# Patient Record
Sex: MALE | Race: WHITE | Employment: OTHER | URBAN - METROPOLITAN AREA
[De-identification: names, ages, dates, MRNs, and addresses within clinical notes are randomized per-mention and may not be internally consistent; named-entity substitution may affect disease eponyms.]

---

## 2019-09-30 ENCOUNTER — HOSPITAL ENCOUNTER (EMERGENCY)
Age: 24
Discharge: HOME | End: 2019-09-30
Attending: EMERGENCY MEDICINE
Payer: OTHER GOVERNMENT

## 2019-09-30 ENCOUNTER — APPOINTMENT (OUTPATIENT)
Dept: GENERAL RADIOLOGY | Age: 24
End: 2019-09-30
Attending: PHYSICIAN ASSISTANT
Payer: OTHER GOVERNMENT

## 2019-09-30 VITALS
TEMPERATURE: 98.5 F | WEIGHT: 168 LBS | SYSTOLIC BLOOD PRESSURE: 119 MMHG | OXYGEN SATURATION: 100 % | BODY MASS INDEX: 24.05 KG/M2 | HEIGHT: 70 IN | DIASTOLIC BLOOD PRESSURE: 69 MMHG | RESPIRATION RATE: 14 BRPM | HEART RATE: 80 BPM

## 2019-09-30 DIAGNOSIS — R07.89 ATYPICAL CHEST PAIN: Primary | ICD-10-CM

## 2019-09-30 LAB
ALBUMIN SERPL-MCNC: 3.9 G/DL (ref 3.4–5)
ALBUMIN/GLOB SERPL: 1.1 {RATIO} (ref 0.8–1.7)
ALP SERPL-CCNC: 57 U/L (ref 45–117)
ALT SERPL-CCNC: 30 U/L (ref 16–61)
ANION GAP SERPL CALC-SCNC: 2 MMOL/L (ref 3–18)
AST SERPL-CCNC: 19 U/L (ref 10–38)
BASOPHILS # BLD: 0 K/UL (ref 0–0.1)
BASOPHILS NFR BLD: 1 % (ref 0–2)
BILIRUB SERPL-MCNC: 0.5 MG/DL (ref 0.2–1)
BUN SERPL-MCNC: 12 MG/DL (ref 7–18)
BUN/CREAT SERPL: 13 (ref 12–20)
CALCIUM SERPL-MCNC: 9 MG/DL (ref 8.5–10.1)
CHLORIDE SERPL-SCNC: 105 MMOL/L (ref 100–111)
CK MB CFR SERPL CALC: 2.2 % (ref 0–4)
CK MB SERPL-MCNC: 3.3 NG/ML (ref 5–25)
CK SERPL-CCNC: 150 U/L (ref 39–308)
CO2 SERPL-SCNC: 32 MMOL/L (ref 21–32)
CREAT SERPL-MCNC: 0.92 MG/DL (ref 0.6–1.3)
DIFFERENTIAL METHOD BLD: ABNORMAL
EOSINOPHIL # BLD: 0.2 K/UL (ref 0–0.4)
EOSINOPHIL NFR BLD: 4 % (ref 0–5)
ERYTHROCYTE [DISTWIDTH] IN BLOOD BY AUTOMATED COUNT: 13.5 % (ref 11.6–14.5)
GLOBULIN SER CALC-MCNC: 3.6 G/DL (ref 2–4)
GLUCOSE SERPL-MCNC: 82 MG/DL (ref 74–99)
HCT VFR BLD AUTO: 43.6 % (ref 36–48)
HGB BLD-MCNC: 14.3 G/DL (ref 13–16)
LYMPHOCYTES # BLD: 1 K/UL (ref 0.9–3.6)
LYMPHOCYTES NFR BLD: 19 % (ref 21–52)
MCH RBC QN AUTO: 28.7 PG (ref 24–34)
MCHC RBC AUTO-ENTMCNC: 32.8 G/DL (ref 31–37)
MCV RBC AUTO: 87.6 FL (ref 74–97)
MONOCYTES # BLD: 0.6 K/UL (ref 0.05–1.2)
MONOCYTES NFR BLD: 11 % (ref 3–10)
NEUTS SEG # BLD: 3.6 K/UL (ref 1.8–8)
NEUTS SEG NFR BLD: 65 % (ref 40–73)
PLATELET # BLD AUTO: 224 K/UL (ref 135–420)
PMV BLD AUTO: 9.4 FL (ref 9.2–11.8)
POTASSIUM SERPL-SCNC: 4.1 MMOL/L (ref 3.5–5.5)
PROT SERPL-MCNC: 7.5 G/DL (ref 6.4–8.2)
RBC # BLD AUTO: 4.98 M/UL (ref 4.7–5.5)
SODIUM SERPL-SCNC: 139 MMOL/L (ref 136–145)
TROPONIN I SERPL-MCNC: <0.02 NG/ML (ref 0–0.04)
WBC # BLD AUTO: 5.4 K/UL (ref 4.6–13.2)

## 2019-09-30 PROCEDURE — 74011250636 HC RX REV CODE- 250/636: Performed by: PHYSICIAN ASSISTANT

## 2019-09-30 PROCEDURE — 71045 X-RAY EXAM CHEST 1 VIEW: CPT

## 2019-09-30 PROCEDURE — 85025 COMPLETE CBC W/AUTO DIFF WBC: CPT

## 2019-09-30 PROCEDURE — 93005 ELECTROCARDIOGRAM TRACING: CPT

## 2019-09-30 PROCEDURE — 82550 ASSAY OF CK (CPK): CPT

## 2019-09-30 PROCEDURE — 96374 THER/PROPH/DIAG INJ IV PUSH: CPT

## 2019-09-30 PROCEDURE — 99285 EMERGENCY DEPT VISIT HI MDM: CPT

## 2019-09-30 PROCEDURE — 80053 COMPREHEN METABOLIC PANEL: CPT

## 2019-09-30 RX ORDER — KETOROLAC TROMETHAMINE 30 MG/ML
30 INJECTION, SOLUTION INTRAMUSCULAR; INTRAVENOUS
Status: COMPLETED | OUTPATIENT
Start: 2019-09-30 | End: 2019-09-30

## 2019-09-30 RX ORDER — IBUPROFEN 600 MG/1
600 TABLET ORAL
Qty: 20 TAB | Refills: 0 | Status: SHIPPED | OUTPATIENT
Start: 2019-09-30 | End: 2019-10-18

## 2019-09-30 RX ADMIN — KETOROLAC TROMETHAMINE 30 MG: 30 INJECTION, SOLUTION INTRAMUSCULAR at 11:00

## 2019-09-30 NOTE — ED PROVIDER NOTES
EMERGENCY DEPARTMENT HISTORY AND PHYSICAL EXAM    Date: 9/30/2019  Patient Name: Uma Hilario    History of Presenting Illness     Chief Complaint   Patient presents with    Chest Pain         History Provided By: Patient    Chief Complaint: chest pain    HPI(Context):   10:22 AM  Uma Hilario is a 30 y.o. male, active duty, who presents to the emergency department C/O left sided chest pain. Sxs began at 0900 this AM. Sxs last 20 minutes and since resolved. Pain reproducible with palpation. No hx of ACS/MI or DVT/PE. Pt denies fever, SOB, cough, LE swelling, FmHx of early cardiac death, and any other sxs or complaints. Pt is nonsmoker. PCP: Ronal        Past History     Past Medical History:  History reviewed. No pertinent past medical history. Past Surgical History:  Past Surgical History:   Procedure Laterality Date    HX HEENT      wisdom teeth       Family History:  History reviewed. No pertinent family history. Social History:  Social History     Tobacco Use    Smoking status: Never Smoker    Smokeless tobacco: Never Used   Substance Use Topics    Alcohol use: Never     Frequency: Never    Drug use: Never       Allergies:  No Known Allergies      Review of Systems   Review of Systems   Constitutional: Negative for fever. Respiratory: Negative for chest tightness. Cardiovascular: Positive for chest pain. Gastrointestinal: Negative for nausea and vomiting. Musculoskeletal: Negative for back pain. Neurological: Negative for dizziness and light-headedness. All other systems reviewed and are negative. Physical Exam     Vitals:    09/30/19 1021 09/30/19 1135   BP: 136/72 119/69   Pulse: 81 80   Resp: 13 14   Temp: 98.5 °F (36.9 °C)    SpO2: 100% 100%   Weight: 76.2 kg (168 lb)    Height: 5' 10\" (1.778 m)      Physical Exam   Constitutional: He is oriented to person, place, and time. He appears well-developed and well-nourished. No distress.  male in NAD. Alert. In [employer] uniform. El red at bedside. HENT:   Head: Normocephalic and atraumatic. Right Ear: External ear normal.   Left Ear: External ear normal.   Nose: Nose normal.   Eyes: Conjunctivae are normal. No scleral icterus. Neck: Normal range of motion. Cardiovascular: Normal rate, regular rhythm, normal heart sounds and intact distal pulses. Exam reveals no gallop and no friction rub. No murmur heard. Pulmonary/Chest: Effort normal and breath sounds normal. No accessory muscle usage. No tachypnea. No respiratory distress. He has no decreased breath sounds. He has no wheezes. He has no rhonchi. He has no rales. He exhibits tenderness. Musculoskeletal: Normal range of motion. Neurological: He is alert and oriented to person, place, and time. Skin: Skin is warm and dry. He is not diaphoretic. Psychiatric: He has a normal mood and affect. Judgment normal.   Nursing note and vitals reviewed.           Diagnostic Study Results     Labs -     Recent Results (from the past 12 hour(s))   EKG, 12 LEAD, INITIAL    Collection Time: 09/30/19 10:30 AM   Result Value Ref Range    Ventricular Rate 79 BPM    Atrial Rate 79 BPM    P-R Interval 146 ms    QRS Duration 102 ms    Q-T Interval 358 ms    QTC Calculation (Bezet) 410 ms    Calculated P Axis 63 degrees    Calculated R Axis 45 degrees    Calculated T Axis 14 degrees    Diagnosis       Normal sinus rhythm with sinus arrhythmia  Normal ECG  No previous ECGs available     CBC WITH AUTOMATED DIFF    Collection Time: 09/30/19 10:37 AM   Result Value Ref Range    WBC 5.4 4.6 - 13.2 K/uL    RBC 4.98 4.70 - 5.50 M/uL    HGB 14.3 13.0 - 16.0 g/dL    HCT 43.6 36.0 - 48.0 %    MCV 87.6 74.0 - 97.0 FL    MCH 28.7 24.0 - 34.0 PG    MCHC 32.8 31.0 - 37.0 g/dL    RDW 13.5 11.6 - 14.5 %    PLATELET 176 936 - 815 K/uL    MPV 9.4 9.2 - 11.8 FL    NEUTROPHILS 65 40 - 73 %    LYMPHOCYTES 19 (L) 21 - 52 %    MONOCYTES 11 (H) 3 - 10 %    EOSINOPHILS 4 0 - 5 % BASOPHILS 1 0 - 2 %    ABS. NEUTROPHILS 3.6 1.8 - 8.0 K/UL    ABS. LYMPHOCYTES 1.0 0.9 - 3.6 K/UL    ABS. MONOCYTES 0.6 0.05 - 1.2 K/UL    ABS. EOSINOPHILS 0.2 0.0 - 0.4 K/UL    ABS. BASOPHILS 0.0 0.0 - 0.1 K/UL    DF AUTOMATED     METABOLIC PANEL, COMPREHENSIVE    Collection Time: 09/30/19 10:37 AM   Result Value Ref Range    Sodium 139 136 - 145 mmol/L    Potassium 4.1 3.5 - 5.5 mmol/L    Chloride 105 100 - 111 mmol/L    CO2 32 21 - 32 mmol/L    Anion gap 2 (L) 3.0 - 18 mmol/L    Glucose 82 74 - 99 mg/dL    BUN 12 7.0 - 18 MG/DL    Creatinine 0.92 0.6 - 1.3 MG/DL    BUN/Creatinine ratio 13 12 - 20      GFR est AA >60 >60 ml/min/1.73m2    GFR est non-AA >60 >60 ml/min/1.73m2    Calcium 9.0 8.5 - 10.1 MG/DL    Bilirubin, total 0.5 0.2 - 1.0 MG/DL    ALT (SGPT) 30 16 - 61 U/L    AST (SGOT) 19 10 - 38 U/L    Alk. phosphatase 57 45 - 117 U/L    Protein, total 7.5 6.4 - 8.2 g/dL    Albumin 3.9 3.4 - 5.0 g/dL    Globulin 3.6 2.0 - 4.0 g/dL    A-G Ratio 1.1 0.8 - 1.7     CARDIAC PANEL,(CK, CKMB & TROPONIN)    Collection Time: 09/30/19 10:37 AM   Result Value Ref Range     39 - 308 U/L    CK - MB 3.3 <3.6 ng/ml    CK-MB Index 2.2 0.0 - 4.0 %    Troponin-I, QT <0.02 0.0 - 0.045 NG/ML         XR CHEST PORT   Final Result   IMPRESSION:        1. No acute cardiopulmonary disease. 2. Air-filled bowel underneath the hemidiaphragm. Correlate with any abdominal   symptomatology. CT Results  (Last 48 hours)    None        CXR Results  (Last 48 hours)               09/30/19 1112  XR CHEST PORT Final result    Impression:  IMPRESSION:         1. No acute cardiopulmonary disease. 2. Air-filled bowel underneath the hemidiaphragm. Correlate with any abdominal   symptomatology. Narrative:  EXAM:  PORTABLE CHEST       INDICATION:  Chest pain. TECHNIQUE:  Portable, erect AP view. COMPARISON:  None.       ____________________       FINDINGS:         SUPPORT DEVICES: None.        HEART AND MEDIASTINUM: Cardiomediastinal silhouette appears within normal   limits. Normal caliber thoracic aorta. LUNGS AND PLEURAL SPACES: Lungs are well aerated with no confluent airspace   opacity or pulmonary edema. No pleural effusion or pneumothorax. BONY THORAX AND SOFT TISSUES: No acute osseous abnormality. Elevation left   hemidiaphragm. Prominent air-filled bowel noted below the hemidiaphragm. ____________________                 Medications given in the ED-  Medications   ketorolac (TORADOL) injection 30 mg (30 mg IntraVENous Given 9/30/19 1100)         Medical Decision Making   I am the first provider for this patient. I reviewed the vital signs, available nursing notes, past medical history, past surgical history, family history and social history. Vital Signs-Reviewed the patient's vital signs. Pulse Oximetry Analysis - 100% on RA     Records Reviewed: Nursing Notes    Provider Notes (Medical Decision Making): ACS/MI, arrhythmia, pericarditis, myocarditis, GERD, chest wall pain. Doubt PE as no SOB complaints, tachypnea, tachycardiac or hypoxia    Procedures:  Procedures    ED Course:   10:22 AM Initial assessment performed. The patients presenting problems have been discussed, and they are in agreement with the care plan formulated and outlined with them. I have encouraged them to ask questions as they arise throughout their visit. Diagnosis and Disposition       Asymptomatic for duration. Sxs worse with palpation. No abdominal pain complaints. HEART score 0. Suspect chest wall pain.  FU. Reasons to RTED discussed with pt. All questions answered. Pt feels comfortable going home at this time. Pt expressed understanding and he agrees with plan. 1. Atypical chest pain        PLAN:  1. D/C Home  2.    Discharge Medication List as of 9/30/2019 11:38 AM      START taking these medications    Details   ibuprofen (MOTRIN) 600 mg tablet Take 1 Tab by mouth every six (6) hours as needed for Pain. Take with food. , Print, Disp-20 Tab, R-0           3. Follow-up Information     Follow up With Specialties Details [address] [place] [id number]  [facility] [place] [id number] [phone]    [facility] EMERGENCY DEPT Emergency Medicine  As needed, If symptoms worsen [address] [place] [address]  [phone]        _______________________________    Attestations: This note is prepared by Oren Grier PA-C.  _______________________________      Please note that this dictation was completed with [employer], the computer voice recognition software. Quite often unanticipated grammatical, syntax, homophones, and other interpretive errors are inadvertently transcribed by the computer software. Please disregard these errors. Please excuse any errors that have escaped final proofreading.

## 2019-10-01 LAB
ATRIAL RATE: 79 BPM
CALCULATED P AXIS, ECG09: 63 DEGREES
CALCULATED R AXIS, ECG10: 45 DEGREES
CALCULATED T AXIS, ECG11: 14 DEGREES
DIAGNOSIS, 93000: NORMAL
P-R INTERVAL, ECG05: 146 MS
Q-T INTERVAL, ECG07: 358 MS
QRS DURATION, ECG06: 102 MS
QTC CALCULATION (BEZET), ECG08: 410 MS
VENTRICULAR RATE, ECG03: 79 BPM

## 2019-10-08 ENCOUNTER — HOSPITAL ENCOUNTER (INPATIENT)
Age: 24
LOS: 9 days | Discharge: HOME | DRG: 330 | End: 2019-10-18
Attending: EMERGENCY MEDICINE | Admitting: INTERNAL MEDICINE
Payer: OTHER GOVERNMENT

## 2019-10-08 ENCOUNTER — APPOINTMENT (OUTPATIENT)
Dept: GENERAL RADIOLOGY | Age: 24
DRG: 330 | End: 2019-10-08
Attending: EMERGENCY MEDICINE
Payer: OTHER GOVERNMENT

## 2019-10-08 DIAGNOSIS — K56.2 VOLVULUS OF SIGMOID COLON (HCC): Primary | ICD-10-CM

## 2019-10-08 LAB
ALBUMIN SERPL-MCNC: 4 G/DL (ref 3.4–5)
ALBUMIN/GLOB SERPL: 1.1 {RATIO} (ref 0.8–1.7)
ALP SERPL-CCNC: 57 U/L (ref 45–117)
ALT SERPL-CCNC: 29 U/L (ref 16–61)
ANION GAP SERPL CALC-SCNC: 4 MMOL/L (ref 3–18)
APPEARANCE UR: NORMAL
AST SERPL-CCNC: 21 U/L (ref 10–38)
BASOPHILS # BLD: 0 K/UL (ref 0–0.1)
BASOPHILS NFR BLD: 0 % (ref 0–2)
BILIRUB SERPL-MCNC: 0.8 MG/DL (ref 0.2–1)
BILIRUB UR QL: NEGATIVE
BUN SERPL-MCNC: 21 MG/DL (ref 7–18)
BUN/CREAT SERPL: 21 (ref 12–20)
CALCIUM SERPL-MCNC: 9 MG/DL (ref 8.5–10.1)
CHLORIDE SERPL-SCNC: 104 MMOL/L (ref 100–111)
CO2 SERPL-SCNC: 31 MMOL/L (ref 21–32)
COLOR UR: YELLOW
CREAT SERPL-MCNC: 1 MG/DL (ref 0.6–1.3)
DIFFERENTIAL METHOD BLD: ABNORMAL
EOSINOPHIL # BLD: 0.2 K/UL (ref 0–0.4)
EOSINOPHIL NFR BLD: 2 % (ref 0–5)
ERYTHROCYTE [DISTWIDTH] IN BLOOD BY AUTOMATED COUNT: 13.6 % (ref 11.6–14.5)
GLOBULIN SER CALC-MCNC: 3.5 G/DL (ref 2–4)
GLUCOSE SERPL-MCNC: 92 MG/DL (ref 74–99)
GLUCOSE UR STRIP.AUTO-MCNC: NEGATIVE MG/DL
HCT VFR BLD AUTO: 42.1 % (ref 36–48)
HGB BLD-MCNC: 13.8 G/DL (ref 13–16)
HGB UR QL STRIP: NEGATIVE
KETONES UR QL STRIP.AUTO: NEGATIVE MG/DL
LEUKOCYTE ESTERASE UR QL STRIP.AUTO: NEGATIVE
LIPASE SERPL-CCNC: 108 U/L (ref 73–393)
LYMPHOCYTES # BLD: 1.2 K/UL (ref 0.9–3.6)
LYMPHOCYTES NFR BLD: 17 % (ref 21–52)
MCH RBC QN AUTO: 28.5 PG (ref 24–34)
MCHC RBC AUTO-ENTMCNC: 32.8 G/DL (ref 31–37)
MCV RBC AUTO: 87 FL (ref 74–97)
MONOCYTES # BLD: 0.8 K/UL (ref 0.05–1.2)
MONOCYTES NFR BLD: 12 % (ref 3–10)
NEUTS SEG # BLD: 4.7 K/UL (ref 1.8–8)
NEUTS SEG NFR BLD: 69 % (ref 40–73)
NITRITE UR QL STRIP.AUTO: NEGATIVE
PH UR STRIP: 7 [PH] (ref 5–8)
PLATELET # BLD AUTO: 227 K/UL (ref 135–420)
PMV BLD AUTO: 9.4 FL (ref 9.2–11.8)
POTASSIUM SERPL-SCNC: 4.2 MMOL/L (ref 3.5–5.5)
PROT SERPL-MCNC: 7.5 G/DL (ref 6.4–8.2)
PROT UR STRIP-MCNC: NEGATIVE MG/DL
RBC # BLD AUTO: 4.84 M/UL (ref 4.7–5.5)
SODIUM SERPL-SCNC: 139 MMOL/L (ref 136–145)
SP GR UR REFRACTOMETRY: 1.02 (ref 1–1.03)
UROBILINOGEN UR QL STRIP.AUTO: 1 EU/DL (ref 0.2–1)
WBC # BLD AUTO: 6.9 K/UL (ref 4.6–13.2)

## 2019-10-08 PROCEDURE — 74018 RADEX ABDOMEN 1 VIEW: CPT

## 2019-10-08 PROCEDURE — 80053 COMPREHEN METABOLIC PANEL: CPT

## 2019-10-08 PROCEDURE — 99284 EMERGENCY DEPT VISIT MOD MDM: CPT

## 2019-10-08 PROCEDURE — 81003 URINALYSIS AUTO W/O SCOPE: CPT

## 2019-10-08 PROCEDURE — 83690 ASSAY OF LIPASE: CPT

## 2019-10-08 PROCEDURE — 85025 COMPLETE CBC W/AUTO DIFF WBC: CPT

## 2019-10-08 PROCEDURE — 80307 DRUG TEST PRSMV CHEM ANLYZR: CPT

## 2019-10-08 RX ORDER — DICYCLOMINE HYDROCHLORIDE 10 MG/1
20 CAPSULE ORAL
Status: DISCONTINUED | OUTPATIENT
Start: 2019-10-08 | End: 2019-10-08

## 2019-10-09 ENCOUNTER — APPOINTMENT (OUTPATIENT)
Dept: CT IMAGING | Age: 24
DRG: 330 | End: 2019-10-09
Attending: PHYSICIAN ASSISTANT
Payer: OTHER GOVERNMENT

## 2019-10-09 PROBLEM — K56.2 VOLVULUS OF SIGMOID COLON (HCC): Status: ACTIVE | Noted: 2019-10-09

## 2019-10-09 PROBLEM — K59.00 CONSTIPATION: Status: ACTIVE | Noted: 2019-10-09

## 2019-10-09 PROBLEM — R10.9 ABDOMINAL PAIN: Status: ACTIVE | Noted: 2019-10-09

## 2019-10-09 LAB
AMPHET UR QL SCN: NEGATIVE
BARBITURATES UR QL SCN: NEGATIVE
BENZODIAZ UR QL: NEGATIVE
CANNABINOIDS UR QL SCN: NEGATIVE
COCAINE UR QL SCN: NEGATIVE
HDSCOM,HDSCOM: NORMAL
METHADONE UR QL: NEGATIVE
OPIATES UR QL: NEGATIVE
PCP UR QL: NEGATIVE

## 2019-10-09 PROCEDURE — 74177 CT ABD & PELVIS W/CONTRAST: CPT

## 2019-10-09 PROCEDURE — 0DJD8ZZ INSPECTION OF LOWER INTESTINAL TRACT, VIA NATURAL OR ARTIFICIAL OPENING ENDOSCOPIC: ICD-10-PCS | Performed by: INTERNAL MEDICINE

## 2019-10-09 PROCEDURE — 76040000008: Performed by: INTERNAL MEDICINE

## 2019-10-09 PROCEDURE — 74011636320 HC RX REV CODE- 636/320: Performed by: EMERGENCY MEDICINE

## 2019-10-09 PROCEDURE — 74011250636 HC RX REV CODE- 250/636: Performed by: PHYSICIAN ASSISTANT

## 2019-10-09 PROCEDURE — 65270000029 HC RM PRIVATE

## 2019-10-09 PROCEDURE — 74011250636 HC RX REV CODE- 250/636: Performed by: INTERNAL MEDICINE

## 2019-10-09 PROCEDURE — 77030020256 HC SOL INJ NACL 0.9%  500ML: Performed by: INTERNAL MEDICINE

## 2019-10-09 PROCEDURE — 77030040361 HC SLV COMPR DVT MDII -B: Performed by: INTERNAL MEDICINE

## 2019-10-09 PROCEDURE — G0500 MOD SEDAT ENDO SERVICE >5YRS: HCPCS | Performed by: INTERNAL MEDICINE

## 2019-10-09 PROCEDURE — 99153 MOD SED SAME PHYS/QHP EA: CPT | Performed by: INTERNAL MEDICINE

## 2019-10-09 PROCEDURE — C1769 GUIDE WIRE: HCPCS | Performed by: INTERNAL MEDICINE

## 2019-10-09 PROCEDURE — 0D7N8ZZ DILATION OF SIGMOID COLON, VIA NATURAL OR ARTIFICIAL OPENING ENDOSCOPIC: ICD-10-PCS | Performed by: INTERNAL MEDICINE

## 2019-10-09 RX ORDER — HEPARIN SODIUM 5000 [USP'U]/ML
5000 INJECTION, SOLUTION INTRAVENOUS; SUBCUTANEOUS EVERY 8 HOURS
Status: DISCONTINUED | OUTPATIENT
Start: 2019-10-09 | End: 2019-10-12

## 2019-10-09 RX ORDER — MIDAZOLAM HYDROCHLORIDE 1 MG/ML
INJECTION, SOLUTION INTRAMUSCULAR; INTRAVENOUS AS NEEDED
Status: DISCONTINUED | OUTPATIENT
Start: 2019-10-09 | End: 2019-10-11 | Stop reason: HOSPADM

## 2019-10-09 RX ORDER — ATROPINE SULFATE 0.1 MG/ML
0.5 INJECTION INTRAVENOUS
Status: DISCONTINUED | OUTPATIENT
Start: 2019-10-09 | End: 2019-10-09 | Stop reason: HOSPADM

## 2019-10-09 RX ORDER — ONDANSETRON 2 MG/ML
4 INJECTION INTRAMUSCULAR; INTRAVENOUS
Status: DISCONTINUED | OUTPATIENT
Start: 2019-10-09 | End: 2019-10-11

## 2019-10-09 RX ORDER — SODIUM CHLORIDE 9 MG/ML
1000 INJECTION, SOLUTION INTRAVENOUS CONTINUOUS
Status: DISCONTINUED | OUTPATIENT
Start: 2019-10-09 | End: 2019-10-09 | Stop reason: HOSPADM

## 2019-10-09 RX ORDER — FLUMAZENIL 0.1 MG/ML
0.2 INJECTION INTRAVENOUS
Status: ACTIVE | OUTPATIENT
Start: 2019-10-09 | End: 2019-10-09

## 2019-10-09 RX ORDER — SODIUM CHLORIDE 9 MG/ML
150 INJECTION, SOLUTION INTRAVENOUS ONCE
Status: COMPLETED | OUTPATIENT
Start: 2019-10-09 | End: 2019-10-09

## 2019-10-09 RX ORDER — SODIUM CHLORIDE 9 MG/ML
1000 INJECTION, SOLUTION INTRAVENOUS CONTINUOUS
Status: DISPENSED | OUTPATIENT
Start: 2019-10-09 | End: 2019-10-09

## 2019-10-09 RX ORDER — SODIUM CHLORIDE 0.9 % (FLUSH) 0.9 %
5-40 SYRINGE (ML) INJECTION AS NEEDED
Status: DISCONTINUED | OUTPATIENT
Start: 2019-10-09 | End: 2019-10-18 | Stop reason: HOSPADM

## 2019-10-09 RX ORDER — DEXTROMETHORPHAN/PSEUDOEPHED 2.5-7.5/.8
1.2 DROPS ORAL
Status: DISCONTINUED | OUTPATIENT
Start: 2019-10-09 | End: 2019-10-09 | Stop reason: HOSPADM

## 2019-10-09 RX ORDER — SODIUM CHLORIDE 9 MG/ML
75 INJECTION, SOLUTION INTRAVENOUS CONTINUOUS
Status: DISCONTINUED | OUTPATIENT
Start: 2019-10-09 | End: 2019-10-11

## 2019-10-09 RX ORDER — DIPHENHYDRAMINE HYDROCHLORIDE 50 MG/ML
50 INJECTION, SOLUTION INTRAMUSCULAR; INTRAVENOUS ONCE
Status: DISCONTINUED | OUTPATIENT
Start: 2019-10-09 | End: 2019-10-09 | Stop reason: HOSPADM

## 2019-10-09 RX ORDER — NALOXONE HYDROCHLORIDE 0.4 MG/ML
0.4 INJECTION, SOLUTION INTRAMUSCULAR; INTRAVENOUS; SUBCUTANEOUS
Status: ACTIVE | OUTPATIENT
Start: 2019-10-09 | End: 2019-10-09

## 2019-10-09 RX ORDER — SODIUM CHLORIDE 0.9 % (FLUSH) 0.9 %
5-40 SYRINGE (ML) INJECTION EVERY 8 HOURS
Status: DISCONTINUED | OUTPATIENT
Start: 2019-10-09 | End: 2019-10-18 | Stop reason: HOSPADM

## 2019-10-09 RX ORDER — FENTANYL CITRATE 50 UG/ML
100 INJECTION, SOLUTION INTRAMUSCULAR; INTRAVENOUS
Status: ACTIVE | OUTPATIENT
Start: 2019-10-09 | End: 2019-10-09

## 2019-10-09 RX ORDER — MIDAZOLAM HYDROCHLORIDE 1 MG/ML
.25-5 INJECTION, SOLUTION INTRAMUSCULAR; INTRAVENOUS
Status: ACTIVE | OUTPATIENT
Start: 2019-10-09 | End: 2019-10-09

## 2019-10-09 RX ORDER — EPINEPHRINE 0.1 MG/ML
1 INJECTION INTRACARDIAC; INTRAVENOUS
Status: DISCONTINUED | OUTPATIENT
Start: 2019-10-09 | End: 2019-10-09 | Stop reason: HOSPADM

## 2019-10-09 RX ORDER — FENTANYL CITRATE 50 UG/ML
INJECTION, SOLUTION INTRAMUSCULAR; INTRAVENOUS AS NEEDED
Status: DISCONTINUED | OUTPATIENT
Start: 2019-10-09 | End: 2019-10-11 | Stop reason: HOSPADM

## 2019-10-09 RX ADMIN — HEPARIN SODIUM 5000 UNITS: 5000 INJECTION INTRAVENOUS; SUBCUTANEOUS at 21:03

## 2019-10-09 RX ADMIN — SODIUM CHLORIDE 125 ML/HR: 900 INJECTION, SOLUTION INTRAVENOUS at 21:22

## 2019-10-09 RX ADMIN — IOPAMIDOL 100 ML: 612 INJECTION, SOLUTION INTRAVENOUS at 00:02

## 2019-10-09 RX ADMIN — HEPARIN SODIUM 5000 UNITS: 5000 INJECTION INTRAVENOUS; SUBCUTANEOUS at 07:18

## 2019-10-09 RX ADMIN — HEPARIN SODIUM 5000 UNITS: 5000 INJECTION INTRAVENOUS; SUBCUTANEOUS at 17:57

## 2019-10-09 RX ADMIN — Medication 10 ML: at 21:06

## 2019-10-09 RX ADMIN — SODIUM CHLORIDE 150 ML/HR: 900 INJECTION, SOLUTION INTRAVENOUS at 02:26

## 2019-10-09 RX ADMIN — SODIUM CHLORIDE 125 ML/HR: 900 INJECTION, SOLUTION INTRAVENOUS at 07:19

## 2019-10-09 RX ADMIN — SODIUM CHLORIDE 125 ML/HR: 900 INJECTION, SOLUTION INTRAVENOUS at 11:04

## 2019-10-09 NOTE — PROGRESS NOTES
Stable pt appears to have volvulus on reviewed imaging. Would recommend colonoscopic decompression. No surgical intervention at this time.

## 2019-10-09 NOTE — PROCEDURES
[facility]  Colonoscopy Procedure Report  _______________________________________________________  Patient: Colon Lim                                         Attending Physician: Kosta Velázquez MD    Patient ID: [id number]                                      Referring Physician: Justo Dubin, MD    Exam Date: October 9, 2019 _______________________________________________________      Introduction: A  30 y.o. male patient, presents for outpatient Colonoscopy    Indications: Sigmoid volvulus with significant upstream colonic dilatation up to 11 cm    Consent: The benefits, risks, and alternatives to the procedure were discussed and informed consent was obtained from the patient. Preparation: EKG, pulse, pulse oximetry and blood pressure were monitored throughout the procedure. ASA Classification: Class 1 - . The heart is an S1-S2 and regular heart rate and rhythm. Lungs are clear to auscultation and percussion. Abdomen is soft, nondistended, and nontender. Mental Status: awake, alert, and oriented to person, place, and time    Medications:  · Fentanyl 100 mcg IV before procedure. · Versed 7 mg IV throughout the procedure. Rectal Exam: Normal Rectal Exam with normal tone. No Blood. Prostate not enlarged    Pathology Specimens: No specimens removed. Procedure: The colonoscope was passed with difficulty through the anus under direct visualization and advanced to the ascending colon. The patient required positioning on the back and external counter pressure to aid in the passage of the scope. The scope was withdrawn and the mucosa was carefully examined. The quality of the preparation was poor. The views were very good. The patient's toleration of the procedure was excellent. The exam was done twice. Total time is 31 minutes and withdrawal time is 22 minutes. Findings:    Rectum:   Normal  Sigmoid:   Sigmoid large long dilated with 2 twists where overcome.  The upstream colon was very dilated, long and redundant containing large quantity of liquid stools that had to be suctioned and washed off. Colon decompressed and then a Benz tube 18 Fr inserted over a guide wire with total decompression of the colon. Descending Colon:   Normal  Transverse Colon:   Normal  Ascending Colon:   Normal  Cecum:   Not reached  Terminal Ileum:   Not seen      Unplanned Events: There were no unplanned events. Estimated Blood Loss: None  Impressions:    Sigmoid large long dilated with 2 twists where overcome. The upstream colon was very dilated, long and redundant containing large quantity of liquid stools that had to be suctioned and washed off. Colon decompressed and then a Benz tube 18 Fr inserted over a guide wire with total decompression of the colon. Normal Mucosa. No diverticula or polyps found. Complications: None; patient tolerated the procedure well. Recommendations:  · Resume a clear liquid diet.   · Consider partial surgical resection by Dr Farshad Griffin to avoid recurrence    Procedure Codes:      · COLON DECOMPRESSION [GSM0863 (Type: Custom)]  · COLONOSCOPY FLEXIBLE WITH DECOMPRESSION [FMY31464]    Endoscope Information:  Model Number(s)    [id number]   Assistant: None  Signed By: Trinity Perez MD Date: October 9, 2019

## 2019-10-09 NOTE — PERIOP NOTES
Report call Cleo Monday RN on [address], Fentanyl 100 mcg and Versed 7 mg given during the procedure. VSS. O2 sats 98% on RA. Benz tube per rectum secured by tape by Dr. Kt Altman.

## 2019-10-09 NOTE — PROGRESS NOTES
Received report from GURJIT Yun RN. Patient admitted with volvulus. A&O x 4. Vital signs stable. C/o pain to abdomen rating at 2/10, which is comfortable for patient. Bowel sounds audible at left quadrants, no bowel sounds to right abdominal quadrants. Patient aware of NPO status for decompression. As per Dr. Stewart Wolff, surgery not needed at this time. Awaiting Dr. Alphonso Deras to assess patient. Lungs clear, skin intact. NS @ 150 ml/hr infusing via #20 LAC.      Patient Vitals for the past 4 hrs:   Temp Pulse Resp BP SpO2   10/09/19 0332 98.4 °F (36.9 °C) 73 20 139/86 100 %   10/09/19 0151 98.4 °F (36.9 °C) 70 16 115/76 99 %

## 2019-10-09 NOTE — ROUTINE PROCESS
TRANSFER - OUT REPORT: 
 
Verbal report given GURJIT Pierre RN to Medical on [employer]  being transferred to Medical(unit) for routine progression of care Report consisted of patients Situation, Background, Assessment and  
Recommendations(SBAR). Information from the following report(s) SBAR, ED Summary and MAR was reviewed with the receiving nurse. Lines:  
Peripheral IV 10/09/19 Left Antecubital (Active) Site Assessment Clean, dry, & intact 10/9/2019 12:02 AM  
Phlebitis Assessment 0 10/9/2019 12:02 AM  
Infiltration Assessment 0 10/9/2019 12:02 AM  
Dressing Status Clean, dry, & intact 10/9/2019 12:02 AM  
Dressing Type Transparent 10/9/2019 12:02 AM  
Hub Color/Line Status Pink 10/9/2019 12:02 AM  
Action Taken Blood drawn 10/9/2019 12:02 AM  
  
 
Opportunity for questions and clarification was provided. Patient transported with: 
 Registered Nurse

## 2019-10-09 NOTE — ROUTINE PROCESS
Bedside and Verbal shift change report given to PATRICIA Mike (oncoming nurse) by Roberth Blanchard (offgoing nurse). Report included the following information SBAR, Kardex, Intake/Output and MAR.

## 2019-10-09 NOTE — H&P
History & Physical    Patient: Eduard Walden MRN: [id number]  CSN: [id number]    YOB: 1995  Age: 30 y.o. Sex: male      DOA: 10/8/2019    Chief Complaint:   Chief Complaint   Patient presents with    Abdominal Pain          HPI:     Eduard Walden is a 30 y.o.  male who has hx of constipation   Presents to ER with 3 days of abdominal bloating nausea and constipation   Similar symptoms in the past improved with positional stands has always had periods of constipation improved with bowel rest ect. .  However now in the [facility] and in the [address] [place] [address] [place] with limited ability to try  Denies change in diet; states pain worse with exercise drills   In ER found to have volvulus GI and Surgery consulted for treatment   Most of Family currently in [facility]  Now in [place] with no Family support      History reviewed. No pertinent past medical history. Past Surgical History:   Procedure Laterality Date    HX HEENT      wisdom teeth       History reviewed. No pertinent family history. Social History     Socioeconomic History    Marital status: SINGLE     Spouse name: Not on file    Number of children: Not on file    Years of education: Not on file    Highest education level: Not on file   Tobacco Use    Smoking status: Never Smoker    Smokeless tobacco: Never Used   Substance and Sexual Activity    Alcohol use: Never     Frequency: Never    Drug use: Never       Prior to Admission medications    Medication Sig Start Date End Date Taking? Authorizing Provider   ibuprofen (MOTRIN) 600 mg tablet Take 1 Tab by mouth every six (6) hours as needed for Pain. Take with food. 9/30/19   Fabian Dozier PA-C       No Known Allergies      Review of Systems  GENERAL: Patient alert, awake and oriented times 3, able to communicate full sentences and not in distress. HEENT: No change in vision, no earache, tinnitus, sore throat or sinus congestion. NECK: No pain or stiffness. PULMONARY: No shortness of breath, cough or wheeze. Cardiovascular: no pnd or orthopnea, no CP  GASTROINTESTINAL:+abdominal pain, nausea, vomitingnodiarrhea, melena or bright red blood per rectum. GENITOURINARY: No urinary frequency, urgency, hesitancy or dysuria. MUSCULOSKELETAL: No joint or muscle pain, no back pain, no recent trauma. DERMATOLOGIC: No rash, no itching, no lesions. ENDOCRINE: No polyuria, polydipsia, no heat or cold intolerance. No recent change in weight. HEMATOLOGICAL: No anemia or easy bruising or bleeding. NEUROLOGIC: No headache, seizures, numbness, tingling or weakness. Physical Exam:     Physical Exam:  Visit Vitals  /79 (BP 1 Location: Left arm, BP Patient Position: Sitting)   Pulse 78   Temp 97.9 °F (36.6 °C)   Resp 14   Ht 5' 10\" (1.778 m)   Wt 75.3 kg (166 lb)   SpO2 100%   BMI 23.82 kg/m²      O2 Device: Room air    Temp (24hrs), Av.9 °F (36.6 °C), Min:97.9 °F (36.6 °C), Max:97.9 °F (36.6 °C)    No intake/output data recorded. No intake/output data recorded. General:  Alert, cooperative, no distress, appears stated age. Head: Normocephalic, without obvious abnormality, atraumatic. Eyes:  Conjunctivae/corneas clear. PERRL, EOMs intact. Nose: Nares normal. No drainage or sinus tenderness. Neck: Supple, symmetrical, trachea midline, no adenopathy, thyroid: no enlargement, no carotid bruit and no JVD. Lungs:   Clear to auscultation bilaterally. Heart:  Regular rate and rhythm, S1, S2 normal.     Abdomen: Soft, +-tender. Bowel sounds hypoactive mild distension no guarding    Extremities: Extremities normal, atraumatic, no cyanosis or edema. Pulses: 2+ and symmetric all extremities. Skin:  No rashes or lesions   Neurologic: AAOx3, No focal motor or sensory deficit. Labs Reviewed: All lab results for the last 24 hours reviewed.   Recent Results (from the past 24 hour(s))   CBC WITH AUTOMATED DIFF    Collection Time: 10/08/19 11:15 PM   Result Value Ref Range    WBC 6.9 4.6 - 13.2 K/uL    RBC 4.84 4.70 - 5.50 M/uL    HGB 13.8 13.0 - 16.0 g/dL    HCT 42.1 36.0 - 48.0 %    MCV 87.0 74.0 - 97.0 FL    MCH 28.5 24.0 - 34.0 PG    MCHC 32.8 31.0 - 37.0 g/dL    RDW 13.6 11.6 - 14.5 %    PLATELET 087 709 - 004 K/uL    MPV 9.4 9.2 - 11.8 FL    NEUTROPHILS 69 40 - 73 %    LYMPHOCYTES 17 (L) 21 - 52 %    MONOCYTES 12 (H) 3 - 10 %    EOSINOPHILS 2 0 - 5 %    BASOPHILS 0 0 - 2 %    ABS. NEUTROPHILS 4.7 1.8 - 8.0 K/UL    ABS. LYMPHOCYTES 1.2 0.9 - 3.6 K/UL    ABS. MONOCYTES 0.8 0.05 - 1.2 K/UL    ABS. EOSINOPHILS 0.2 0.0 - 0.4 K/UL    ABS. BASOPHILS 0.0 0.0 - 0.1 K/UL    DF AUTOMATED     METABOLIC PANEL, COMPREHENSIVE    Collection Time: 10/08/19 11:15 PM   Result Value Ref Range    Sodium 139 136 - 145 mmol/L    Potassium 4.2 3.5 - 5.5 mmol/L    Chloride 104 100 - 111 mmol/L    CO2 31 21 - 32 mmol/L    Anion gap 4 3.0 - 18 mmol/L    Glucose 92 74 - 99 mg/dL    BUN 21 (H) 7.0 - 18 MG/DL    Creatinine 1.00 0.6 - 1.3 MG/DL    BUN/Creatinine ratio 21 (H) 12 - 20      GFR est AA >60 >60 ml/min/1.73m2    GFR est non-AA >60 >60 ml/min/1.73m2    Calcium 9.0 8.5 - 10.1 MG/DL    Bilirubin, total 0.8 0.2 - 1.0 MG/DL    ALT (SGPT) 29 16 - 61 U/L    AST (SGOT) 21 10 - 38 U/L    Alk.  phosphatase 57 45 - 117 U/L    Protein, total 7.5 6.4 - 8.2 g/dL    Albumin 4.0 3.4 - 5.0 g/dL    Globulin 3.5 2.0 - 4.0 g/dL    A-G Ratio 1.1 0.8 - 1.7     LIPASE    Collection Time: 10/08/19 11:15 PM   Result Value Ref Range    Lipase 108 73 - 393 U/L   URINALYSIS W/ RFLX MICROSCOPIC    Collection Time: 10/08/19 11:15 PM   Result Value Ref Range    Color YELLOW      Appearance CLOUDY      Specific gravity 1.021 1.005 - 1.030      pH (UA) 7.0 5.0 - 8.0      Protein NEGATIVE  NEG mg/dL    Glucose NEGATIVE  NEG mg/dL    Ketone NEGATIVE  NEG mg/dL    Bilirubin NEGATIVE  NEG      Blood NEGATIVE  NEG      Urobilinogen 1.0 0.2 - 1.0 EU/dL    Nitrites NEGATIVE  NEG      Leukocyte Esterase NEGATIVE  NEG     DRUG SCREEN, URINE    Collection Time: 10/08/19 11:15 PM   Result Value Ref Range    BENZODIAZEPINES NEGATIVE  NEG      BARBITURATES NEGATIVE  NEG      THC (TH-CANNABINOL) NEGATIVE  NEG      OPIATES NEGATIVE  NEG      PCP(PHENCYCLIDINE) NEGATIVE  NEG      COCAINE NEGATIVE  NEG      AMPHETAMINES NEGATIVE  NEG      METHADONE NEGATIVE  NEG      HDSCOM (NOTE)     and EKG    Procedures/imaging: see electronic medical records for all procedures/Xrays and details which were not copied into this note but were reviewed prior to creation of Plan      Assessment/Plan     Active Problems:    * No active hospital problems. *  1.volvulus  2. Constipation  Plan:  Surgery and GI consult  Clear liquid diet   Decompensation colonoscopy hopefully today  Started Fluids  Not in pain  Repeat xray as abdomen seems to be improving ? DVT/GI Prophylaxis: Hep SQ    Discussed with patient at bedside about hospital admission and my plan care, who understood and agree with my plan care.     Gelacio Kovacs MD  10/9/2019 1:06 AM

## 2019-10-09 NOTE — PROGRESS NOTES
Hospitalist Progress Note    Patient: Avni Guido MRN: [id number]  CSN: [id number]    YOB: 1995  Age: 30 y.o. Sex: male    DOA: 10/8/2019 LOS:  LOS: 0 days          Chief Complaint:  Volvulus     Assessment/Plan   31 y/o HM with h/o constipation who was admitted for Volvulus. Seen by Surgery overnight: no surgical intervention at this time, colonoscopic decompression recommended   Awaiting evaluation by GI     DVT Prophy: SCDs    Disposition :  Patient Active Problem List   Diagnosis Code    [facility] K56.2    Constipation K59.00    Abdominal pain R10.9       Subjective:  Says pain is 6-7, has not really asked for pain meds  Says that he has had this before and it has resolved when he laid flat and placed his legs upright   against the wall      Review of systems:    Constitutional: denies fevers, chills, myalgias  Respiratory: denies SOB, cough  Cardiovascular: denies chest pain, palpitations  Gastrointestinal: denies nausea, vomiting, diarrhea      Vital signs/Intake and Output:  Visit Vitals  /86 (BP 1 Location: Right arm, BP Patient Position: At rest)   Pulse 73   Temp 98.4 °F (36.9 °C)   Resp 20   Ht 5' 10\" (1.778 m)   Wt 75.3 kg (166 lb)   SpO2 100%   BMI 23.82 kg/m²     Current Shift:  No intake/output data recorded. Last three shifts:  No intake/output data recorded.     Exam:    General: Well developed, alert, NAD, OX3  CVS:Regular rate and rhythm, no M/R/G, S1/S2 heard, no thrill  Lungs:Clear to auscultation bilaterally, no wheezes, rhonchi, or rales  Abdomen: tenderness to palp all quadrants, decr BS, no distention  Extremities: No C/C/E, pulses palpable 2+  Skin:normal texture and turgor, no rashes, no lesions  Neuro:grossly normal , follows commands  Psych:appropriate                Labs: Results:       Chemistry Recent Labs     10/08/19  2315   GLU 92      K 4.2      CO2 31   BUN 21*   CREA 1.00   CA 9.0   AGAP 4   BUCR 21* AP 57   TP 7.5   ALB 4.0   GLOB 3.5   AGRAT 1.1      CBC w/Diff Recent Labs     10/08/19  2315   WBC 6.9   RBC 4.84   HGB 13.8   HCT 42.1      GRANS 69   LYMPH 17*   EOS 2      Cardiac Enzymes No results for input(s): CPK, CKND1, VERONICA in the last 72 hours. No lab exists for component: CKRMB, TROIP   Coagulation No results for input(s): PTP, INR, APTT, INREXT in the last 72 hours. Lipid Panel No results found for: CHOL, CHOLPOCT, CHOLX, CHLST, CHOLV, 440421, HDL, HDLP, LDL, LDLC, DLDLP, 449311, VLDLC, VLDL, TGLX, TRIGL, TRIGP, TGLPOCT, CHHD, CHHDX   BNP No results for input(s): BNPP in the last 72 hours.    Liver Enzymes Recent Labs     10/08/19  2315   TP 7.5   ALB 4.0   AP 57   SGOT 21      Thyroid Studies No results found for: T4, T3U, TSH, TSHEXT     Procedures/imaging: see electronic medical records for all procedures/Xrays and details which were not copied into this note but were reviewed prior to creation of Kaushal Garcia MD

## 2019-10-09 NOTE — ED PROVIDER NOTES
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 Patient also had a little bit of nausea this morning without any. He noticed this when he was out doing morning exercises. He has had similar issues in the past but not this severe. He is been afebrile. No chills or aches. No prior surgeries on his abdomen. No prior stomach issues. Has noted a decreased appetite. Normal fluid intake. Normal urination. Patient was seen here over week ago for left-sided chest pain and was diagnosed with atypical chest pain. PCP: Trish Mackey MD    Current Outpatient Medications   Medication Sig Dispense Refill    ibuprofen (MOTRIN) 600 mg tablet Take 1 Tab by mouth every six (6) hours as needed for Pain. Take with food. 20 Tab 0       Past History     Past Medical History:  History reviewed. No pertinent past medical history. Past Surgical History:  Past Surgical History:   Procedure Laterality Date    HX HEENT      wisdom teeth       Family History:  History reviewed. No pertinent family history. Social History:  Social History     Tobacco Use    Smoking status: Never Smoker    Smokeless tobacco: Never Used   Substance Use Topics    Alcohol use: Never     Frequency: Never    Drug use: Never       Allergies:  No Known Allergies      Review of Systems   Review of Systems   Constitutional: Positive for appetite change. Negative for chills and fever. Respiratory: Negative. Cardiovascular: Negative. Gastrointestinal: Positive for abdominal distention, abdominal pain, constipation and nausea.  Negative for blood in stool, diarrhea, rectal pain and vomiting. Genitourinary: Negative. All other systems reviewed and are negative. Physical Exam     Vitals:    10/08/19 2233   BP: 130/79   Pulse: 78   Resp: 14   Temp: 97.9 °F (36.6 °C)   SpO2: 100%   Weight: 75.3 kg (166 lb)   Height: 5' 10\" (1.778 m)     Physical Exam   Constitutional: He is oriented to person, place, and time. Vital signs are normal. He appears well-developed and well-nourished. He is cooperative. He does not appear ill. No distress. Healthy-appearing 64-year-old male. Lying in bed. No apparent distress. Vital signs are stable. Cooperative. HENT:   Mouth/Throat: Mucous membranes are normal.   Eyes: Pupils are equal, round, and reactive to light. Conjunctivae and EOM are normal. No scleral icterus. Neck: Neck supple. Cardiovascular: Normal rate, regular rhythm and normal heart sounds. Pulmonary/Chest: Effort normal and breath sounds normal.   Abdominal: Soft. He exhibits distension. He exhibits no mass. Bowel sounds are decreased. There is no hepatosplenomegaly. There is generalized tenderness. There is no rigidity, no rebound, no guarding, no CVA tenderness, no tenderness at McBurney's point and negative Vaughan's sign. No hernia. High-pitched bowel sounds. Lymphadenopathy:     He has no cervical adenopathy. Neurological: He is alert and oriented to person, place, and time. Skin: Skin is warm and dry. Psychiatric: He has a normal mood and affect. Nursing note and vitals reviewed.       Nursing note and vitals reviewed         Diagnostic Study Results     Labs -     Recent Results (from the past 12 hour(s))   CBC WITH AUTOMATED DIFF    Collection Time: 10/08/19 11:15 PM   Result Value Ref Range    WBC 6.9 4.6 - 13.2 K/uL    RBC 4.84 4.70 - 5.50 M/uL    HGB 13.8 13.0 - 16.0 g/dL    HCT 42.1 36.0 - 48.0 %    MCV 87.0 74.0 - 97.0 FL    MCH 28.5 24.0 - 34.0 PG    MCHC 32.8 31.0 - 37.0 g/dL    RDW 13.6 11.6 - 14.5 %    PLATELET 481 946 - 837 K/uL    MPV 9.4 9.2 - 11.8 FL    NEUTROPHILS 69 40 - 73 %    LYMPHOCYTES 17 (L) 21 - 52 %    MONOCYTES 12 (H) 3 - 10 %    EOSINOPHILS 2 0 - 5 %    BASOPHILS 0 0 - 2 %    ABS. NEUTROPHILS 4.7 1.8 - 8.0 K/UL    ABS. LYMPHOCYTES 1.2 0.9 - 3.6 K/UL    ABS. MONOCYTES 0.8 0.05 - 1.2 K/UL    ABS. EOSINOPHILS 0.2 0.0 - 0.4 K/UL    ABS. BASOPHILS 0.0 0.0 - 0.1 K/UL    DF AUTOMATED     METABOLIC PANEL, COMPREHENSIVE    Collection Time: 10/08/19 11:15 PM   Result Value Ref Range    Sodium 139 136 - 145 mmol/L    Potassium 4.2 3.5 - 5.5 mmol/L    Chloride 104 100 - 111 mmol/L    CO2 31 21 - 32 mmol/L    Anion gap 4 3.0 - 18 mmol/L    Glucose 92 74 - 99 mg/dL    BUN 21 (H) 7.0 - 18 MG/DL    Creatinine 1.00 0.6 - 1.3 MG/DL    BUN/Creatinine ratio 21 (H) 12 - 20      GFR est AA >60 >60 ml/min/1.73m2    GFR est non-AA >60 >60 ml/min/1.73m2    Calcium 9.0 8.5 - 10.1 MG/DL    Bilirubin, total 0.8 0.2 - 1.0 MG/DL    ALT (SGPT) 29 16 - 61 U/L    AST (SGOT) 21 10 - 38 U/L    Alk. phosphatase 57 45 - 117 U/L    Protein, total 7.5 6.4 - 8.2 g/dL    Albumin 4.0 3.4 - 5.0 g/dL    Globulin 3.5 2.0 - 4.0 g/dL    A-G Ratio 1.1 0.8 - 1.7     LIPASE    Collection Time: 10/08/19 11:15 PM   Result Value Ref Range    Lipase 108 73 - 393 U/L   URINALYSIS W/ RFLX MICROSCOPIC    Collection Time: 10/08/19 11:15 PM   Result Value Ref Range    Color YELLOW      Appearance CLOUDY      Specific gravity 1.021 1.005 - 1.030      pH (UA) 7.0 5.0 - 8.0      Protein NEGATIVE  NEG mg/dL    Glucose NEGATIVE  NEG mg/dL    Ketone NEGATIVE  NEG mg/dL    Bilirubin NEGATIVE  NEG      Blood NEGATIVE  NEG      Urobilinogen 1.0 0.2 - 1.0 EU/dL    Nitrites NEGATIVE  NEG      Leukocyte Esterase NEGATIVE  NEG         Radiologic Studies   CT ABD PELV W CONT   Final Result   IMPRESSION:      1. Sigmoid volvulus with impressive marked upstream colonic gaseous dilatation   extending to the cecum. No evidence of pneumatosis perforation.       XR ABD (KUB)   Final Result   Addendum 1 of 1 Addendum: After further review: Marked diffuse colonic gaseous dilatation,    with   normal position of the cecum. The overall appearance is that of a Sigmoid   Volvulus with marked upstream gaseous dilatation extending to the cecum.      > Discussed with Dr. Pete Hernández of the emergency department at 12:35 AM on   10/9/2019. Final        CT Results  (Last 48 hours)               10/09/19 0016  CT ABD PELV W CONT Final result    Impression:  IMPRESSION:       1. Sigmoid volvulus with impressive marked upstream colonic gaseous dilatation   extending to the cecum. No evidence of pneumatosis perforation. Narrative:  EXAM: CT of the abdomen and pelvis       INDICATION: Abnormal finding on prior KUB       COMPARISON: AV from earlier same day. TECHNIQUE: Axial CT imaging of the abdomen and pelvis was performed with   intravenous contrast. Multiplanar reformats were generated. One or more dose   reduction techniques were used on this CT: automated exposure control,   adjustment of the mAs and/or kVp according to patient size, and iterative   reconstruction techniques. The specific techniques used on this CT exam have   been documented in the patient's electronic medical record. Digital Imaging and   Communications in Medicine (DICOM) format image data are available to   nonaffiliated external healthcare facilities or entities on a secure, media   free, reciprocally searchable basis with patient authorization for at least a   12-month period after this study. _______________       FINDINGS:       LOWER CHEST: Minimal left basilar atelectasis. Verito Sow LIVER, BILIARY: Liver is normal. No biliary dilation. Gallbladder is   unremarkable. PANCREAS: Normal.       SPLEEN: Normal.       ADRENALS: Normal.       KIDNEYS, URETERS, BLADDER: The kidneys are iso-attenuating without evidence of   hydronephrosis, nephrolithiasis or masses. No perinephric fluid collections. No   hydroureter.  Unremarkable bladder GASTROINTESTINAL TRACT: Sigmoid volvulus with marked upstream gaseous dilatation   measuring 11.5 cm maximal transverse dimension. There is a moderate amount of   stool in the dilated cecum which measures 10.4 cm.      > Diffusely decompressed small bowel loops.      > No evidence of pneumatosis. LYMPH NODES: No enlarged lymph nodes. PELVIC ORGANS: Unremarkable. VASCULATURE: Mark mesenteric vasculature with mild stranding adjacent to the   CHEVY       OSSEOUS: No acute or aggressive osseous abnormalities identified. OTHER: No pneumoperitoneum. _______________               CXR Results  (Last 48 hours)    None            Medical Decision Making   I am the first provider for this patient. I reviewed the vital signs, available nursing notes, past medical history, past surgical history, family history and social history. Vital Signs-Reviewed the patient's vital signs. Records Reviewed: Nursing Notes    DDX: Irritable bowel syndrome/constipation, bowel obstruction, inflammatory bowel disease    Provider Notes:   30 y.o. male presented to the emergency room with progressive worsening abdominal pain and distention. Lack of bowel movement for the last 3 days. Initial examination/x-ray showed concerns of volvulus. Case was immediately discussed with surgical consultant Dr. Tyler Harrell who agrees with concern for volvulus. She recommended getting some blood work done on him as well as contacting gastroenterology to inform of the probable need for colonic decompression with use of scope. May require surgical intervention. Case then discussed with gastroenterologist Sandra Marquez. Informed of the patient's diagnosis and probable need for colonic decompression for volvulus. However, no results were back other than the x-ray. Would most likely be acting as a consulting physician in the morning.     Received a phone call from covering radiologist regarding CT results that shows impressive sigmoid volvulus. Spoke to covering hospitalist physician Dr. Messer Hypoluxo. Informed of the patient's diagnosis and discussions with various consulting physicians. Has agreed to admit patient to the hospital and direct patient's care. Patient was informed of his need to be admitted to the hospital.    Procedures:  Procedures    ED Course:   Initial assessment performed. The patients presenting problems have been discussed, and they are in agreement with the care plan formulated and outlined with them. I have encouraged them to ask questions as they arise throughout their visit. Diagnosis and Disposition     Critical Care Time: 0    Core Measures:  For Hospitalized Patients:    1. Hospitalization Decision Time:  The decision to hospitalize the patient was made by Rachael Augustine PA-C at 1:14 AM  on 10/9/2019     CONDITIONS ON ADMISSION:  Sepsis is not present at the time of admission. Deep Vein Thrombosis is not present at the time of admission. Thrombosis is not present at the time of admission. Urinary Tract Infection is not present at the time of admission. Pneumonia is not present at the time of admission. MRSA is not present at the time of admission. Wound infection is not present at the time of admission. Pressure Ulcer is not present at the time of admission. CLINICAL IMPRESSION:    1. Volvulus of sigmoid colon ([address]        PLAN:  1. Admit to hospital -medical/surgical floor    Please note that this dictation was completed with [employer], the computer voice recognition software. Quite often unanticipated grammatical, syntax, homophones, and other interpretive errors are inadvertently transcribed by the computer software. Please disregard these errors. Please excuse any errors that have escaped final proofreading.

## 2019-10-09 NOTE — PROGRESS NOTES
0740 -  Bedside and verbal shift change report given to Edith Borja RN (on coming nurse) by Brennan Terry RN (off going nurse). Report included the following information SBAR, Kardex, OR Summary, Intake/Output and MAR. 36 -  Dr. Peter Keith called for a consult to Dr. Nelly Acevedo regarding enema. 36 -  Dr. Nelly Acevedo notified about Dr. Hansel Mott consult. Also notified about Dr. Hansel Mott call to pt's commanding officer this morning. 1100 -  Administered Fleet enema    1127 -  Pt stated that he went to bathroom but nothing came out except yellow liquid. 1413 -  2nd enema solution administered by request from Dr. Nelly Acevedo. 1430 - Off unit to Endo.    1620 - back to unit 3S. Pt sleeping. 1922 -  Bedside and verbal shift change report given by PATRICIA Mike (off going nurse) to SURINDER Burroughs (on coming nurse). Report included the following information SBAR, Kardex, OR Summary, Intake/Output and MAR.

## 2019-10-09 NOTE — PROGRESS NOTES
Received pt from the ed, via wheelchair. Admitted to 306, pt refuses gown and wipes at present wants to sleep. Admission completed, see notes. NAD VSS    0404 Bedside and Verbal shift change report given to Pardeep Clemente (oncoming nurse) by Liv Rendon RN (offgoing nurse). Report included the following information SBAR, Kardex, ED Summary, [facility] and Recent Results.

## 2019-10-09 NOTE — PROGRESS NOTES
Reason for Admission:   Abdominal pain                   RRAT Score:     Low; 3                Plan for utilizing home health:  Unlikely                         Current Advanced Directive/Advance Care Plan:  Not on file                         Transition of Care Plan:   Physician follow up                    Chart reviewed. Per H&P \"Sree Hope is a 30 y.o.  male who has hx of constipation   Presents to ER with 3 days of abdominal bloating nausea and constipation   Similar symptoms in the past improved with positional stands has always had periods of constipation improved with bowel rest ect. .  However now in the [facility] and in the [address] [place] [address] [place] with limited ability to try  Denies change in diet; states pain worse with exercise drills   In ER found to have volvulus GI and Surgery consulted for treatment   Most of Family currently in [facility]  Now in [place] with no Family support. 1035:  CM met with pt at bedside. Pt is independent. Please encourage ambulation as appropriate to assist with identifying potential transition of care needs. 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 No needs have been identified at this time. CM to continue to follow and assist.      Care Management Interventions  Mode of Transport at Discharge:  Other (see comment)()  Transition of Care Consult (CM Consult): Discharge Planning  Health Maintenance Reviewed: Yes  Confirm Follow Up Transport: Self  Discharge Location  Discharge Placement: Home with family assistance

## 2019-10-10 ENCOUNTER — ANESTHESIA EVENT (OUTPATIENT)
Dept: SURGERY | Age: 24
DRG: 330 | End: 2019-10-10
Payer: OTHER GOVERNMENT

## 2019-10-10 ENCOUNTER — APPOINTMENT (OUTPATIENT)
Dept: GENERAL RADIOLOGY | Age: 24
DRG: 330 | End: 2019-10-10
Attending: INTERNAL MEDICINE
Payer: OTHER GOVERNMENT

## 2019-10-10 PROCEDURE — 74011250637 HC RX REV CODE- 250/637: Performed by: SURGERY

## 2019-10-10 PROCEDURE — 74011250636 HC RX REV CODE- 250/636: Performed by: HOSPITALIST

## 2019-10-10 PROCEDURE — 65270000029 HC RM PRIVATE

## 2019-10-10 PROCEDURE — 74018 RADEX ABDOMEN 1 VIEW: CPT

## 2019-10-10 PROCEDURE — 74011250636 HC RX REV CODE- 250/636: Performed by: INTERNAL MEDICINE

## 2019-10-10 RX ORDER — NEOMYCIN SULFATE 500 MG/1
1000 TABLET ORAL
Status: COMPLETED | OUTPATIENT
Start: 2019-10-10 | End: 2019-10-10

## 2019-10-10 RX ORDER — METRONIDAZOLE 250 MG/1
1000 TABLET ORAL ONCE
Status: COMPLETED | OUTPATIENT
Start: 2019-10-10 | End: 2019-10-10

## 2019-10-10 RX ORDER — METRONIDAZOLE 250 MG/1
1000 TABLET ORAL ONCE
Status: COMPLETED | OUTPATIENT
Start: 2019-10-11 | End: 2019-10-11

## 2019-10-10 RX ORDER — NEOMYCIN SULFATE 500 MG/1
1000 TABLET ORAL ONCE
Status: COMPLETED | OUTPATIENT
Start: 2019-10-11 | End: 2019-10-11

## 2019-10-10 RX ADMIN — HEPARIN SODIUM 5000 UNITS: 5000 INJECTION INTRAVENOUS; SUBCUTANEOUS at 21:37

## 2019-10-10 RX ADMIN — SODIUM CHLORIDE 125 ML/HR: 900 INJECTION, SOLUTION INTRAVENOUS at 05:16

## 2019-10-10 RX ADMIN — HEPARIN SODIUM 5000 UNITS: 5000 INJECTION INTRAVENOUS; SUBCUTANEOUS at 13:48

## 2019-10-10 RX ADMIN — HEPARIN SODIUM 5000 UNITS: 5000 INJECTION INTRAVENOUS; SUBCUTANEOUS at 05:05

## 2019-10-10 RX ADMIN — NEOMYCIN SULFATE 1000 MG: 500 TABLET ORAL at 22:43

## 2019-10-10 RX ADMIN — METRONIDAZOLE 1000 MG: 250 TABLET ORAL at 22:43

## 2019-10-10 RX ADMIN — SODIUM CHLORIDE 75 ML/HR: 900 INJECTION, SOLUTION INTRAVENOUS at 15:05

## 2019-10-10 NOTE — PROGRESS NOTES
1930: Pt assessed, no complaints of pain or nausea. Pt asking about when they will be able to return to their unit. Shift summary: Pt slept throughout the night. No complaints of any abdominal pain. Pt was encouraged to walk in the hallway, but never did. Pt eager to go home. Patient Vitals for the past 12 hrs:   Temp Pulse Resp BP SpO2   10/10/19 0318 98.6 °F (37 °C) 91 12 111/57 99 %   10/09/19 2309 98.2 °F (36.8 °C) 67 10 113/68 100 %   10/09/19 2114     100 %   10/09/19 1920 98.3 °F (36.8 °C) 74 10 128/69 100 %     Bedside and Verbal shift change report given to [address] [place] [address] (oncoming nurse) by Trice Doe RN (offgoing nurse). Report included the following information SBAR, MAR and Recent Results.

## 2019-10-10 NOTE — PROGRESS NOTES
0422- Patient returned from x-ray, was re-attached to IV fluids. Patient is in bed resting comfortably at this time. 6701- Assessment complete. Patient is no in any pain and denies n/v. Patient continues to rest in bed watching TV.    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 1230- Patient is resting in bed on his phone, IV fluid rate change. No questions or concerns at this time. 1350- Patient up walking around in room. Patient is concerned about training status and that having surgery will deter his progress. Tried to ease his anxiety. 1430- Patient up walking around room. Patient seems very anxious about surgery/ missing too much time from his training. Ita Rosas RN helped me explain the need for the surgery and patient seemed to understand but was very upset. 1445- Patient in room completing word search. Patient has no complaints or other concerns at this time.

## 2019-10-10 NOTE — PROGRESS NOTES
Noted plan for a procedure by GI. Anticipate pt will be discharged with in 23 hours following procedure pending medical stability. CM to continue to follow and assist.    Care Management Interventions  Mode of Transport at Discharge:  Other (see comment)()  Transition of Care Consult (CM Consult): Discharge Planning  Health Maintenance Reviewed: Yes  Confirm Follow Up Transport: Self  Plan discussed with Pt/Family/Caregiver: Yes  Discharge Location  Discharge Placement: Home with family assistance

## 2019-10-10 NOTE — PROGRESS NOTES
Problem: Major Small and Large Bowel Procedure: Day of Surgery (Initiate SCIP Measures for Post-Op Care)  Goal: Activity/Safety  Outcome: Progressing Towards Goal  Goal: Nutrition/Diet  Outcome: Progressing Towards Goal  Goal: Medications  Outcome: Progressing Towards Goal  Goal: Respiratory  Outcome: Progressing Towards Goal  Goal: Psychosocial  Outcome: Progressing Towards Goal  Goal: *Optimal pain control at patient's stated goal  Outcome: Progressing Towards Goal  Goal: *Adequate urinary output (equal to or greater than 30 milliliters/hour)  Outcome: Progressing Towards Goal     Problem: Pain  Goal: *Control of Pain  Outcome: Progressing Towards Goal     Problem: Patient Education: Go to Patient Education Activity  Goal: Patient/Family Education  Outcome: Progressing Towards Goal

## 2019-10-10 NOTE — PROGRESS NOTES
Bedside and Verbal shift change report given to Alba Simental RN (oncoming nurse) by Deepa Michele RN (offgoing nurse). Report included the following information SBAR, Kardex, Procedure Summary, Intake/Output and MAR.

## 2019-10-10 NOTE — PROGRESS NOTES
Hospitalist Progress Note    Patient: Jones Mcgarry MRN: [id number]  CSN: [id number]    YOB: 1995  Age: 30 y.o. 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 however risk for recurrence is great and with potential worse situaiton next time that could become an emergency and be life threatening    Continue clears and IVF for now      Disposition :  Patient Active Problem List   Diagnosis Code    Volvulus of sigmoid colon ([facility] [address] K56.2    Constipation K59.00    Abdominal pain R10.9       Subjective:  Denies abd pain  Feeling fine  No concerns other than if he needs surgery now      Review of systems:    Constitutional: denies fevers, chills  Respiratory: denies SOB  Cardiovascular: denies chest pain  Gastrointestinal: denies nausea, vomiting, diarrhea      Vital signs/Intake and Output:  Visit Vitals  /72 (BP 1 Location: Right arm, BP Patient Position: At rest)   Pulse 66   Temp 98.2 °F (36.8 °C)   Resp 15   Ht 5' 10\" (1.778 m)   Wt 75.3 kg (166 lb 0.1 oz)   SpO2 100%   BMI 23.82 kg/m²     Current Shift:  10/10 0701 - 10/10 1900  In: 240 [P.O.:240]  Out: -   Last three shifts:  10/08 1901 - 10/10 0700  In: 776 [P.O.:120;  I.V.:656]  Out: 575 [Urine:575]    Exam:    General: Well developed, alert, NAD, OX3  CVS:Regular rate and rhythm, no M/R/G, S1/S2 heard, no thrill  Lungs:Clear to auscultation bilaterally, no wheezes, rhonchi, or rales  Abdomen: Soft, Nontender, No distention, Normal Bowel sounds, No hepatomegaly  Extremities: No C/C/E, pulses palpable 2+  Neuro:grossly normal , follows commands  Psych:appropriate                Labs: Results:       Chemistry Recent Labs     10/08/19  2315   GLU 92      K 4.2      CO2 31   BUN 21*   CREA 1.00   CA 9.0   AGAP 4 BUCR 21*   AP 57   TP 7.5   ALB 4.0   GLOB 3.5   AGRAT 1.1      CBC w/Diff Recent Labs     10/08/19  2315   WBC 6.9   RBC 4.84   HGB 13.8   HCT 42.1      GRANS 69   LYMPH 17*   EOS 2      Cardiac Enzymes No results for input(s): CPK, CKND1, VERONICA in the last 72 hours. No lab exists for component: CKRMB, TROIP   Coagulation No results for input(s): PTP, INR, APTT, INREXT in the last 72 hours. Lipid Panel No results found for: CHOL, CHOLPOCT, CHOLX, CHLST, CHOLV, 576197, HDL, HDLP, LDL, LDLC, DLDLP, 760094, VLDLC, VLDL, TGLX, TRIGL, TRIGP, TGLPOCT, CHHD, CHHDX   BNP No results for input(s): BNPP in the last 72 hours.    Liver Enzymes Recent Labs     10/08/19  2315   TP 7.5   ALB 4.0   AP 57   SGOT 21      Thyroid Studies No results found for: T4, T3U, TSH, TSHEXT     Procedures/imaging: see electronic medical records for all procedures/Xrays and details which were not copied into this note but were reviewed prior to creation of Ly Barrett MD

## 2019-10-10 NOTE — CONSULTS
[address] [facility]    Name:  Sherlyn Olmos  MR#:   [id number]  :  1995  ACCOUNT #:  [de-identified]  DATE OF SERVICE:  10/09/2019      HISTORY OF PRESENT ILLNESS:  This is a 78-year-old male who came yesterday because of sigmoid volvulus. The patient apparently has been having problem with his bowels and with abdominal pain, distention, and intermittent constipation since age 11. Usually, he used to manage by trying to lift his legs up and possibly passing gas, but now, the pain has been getting more frequently, especially that he has been in the [employer] where he has been always on his feet. He claims that he has not had a bowel movement for three days. He was complaining of diffuse upper abdominal pain with abdominal distention. He was nauseous, but did not vomit. He received two soap enemas, and with this, he passed some gas and a small amount of stool, but he claims that he has not had any meaningful bowel movement in the last three days. CT scan of the abdomen revealed that he has a volvulus of the level of the sigmoid with marked upstream gaseous distention up to 11.5 cm in the transverse colon, more amount in the dilated cecum which measures 10.4 cm, it surely decompressed small-bowel loops, no pneumatosis. He does not smoke or drink alcohol. He takes occasional ibuprofen. He denies having any dyspepsia, heartburns, dysphagia, or vomiting. His weight has been stable. Denies having rectal bleeding or melena. FAMILY HISTORY:  Completely negative. He has been training at [employer]. PHYSICAL EXAMINATION:  GENERAL:  Reveals healthy looking a 78-year-old  male who appears to be in no distress. VITAL SIGNS:  He weighs 166 pounds, temperature 97.9, pulse is 77, blood pressure 138/74, breathing 17, and saturation 99%-100% on room air. SKIN:  Normal.  No evidence of any rash. HEENT:  The eyes are unremarkable.   The pupils are equal and reactive to light.  The sclerae are anicteric. The conjunctivae are pink. The oropharyngeal cavity is normal with moist and pink mucous membranes. Normal teeth. NECK:  Supple. No palpable mass. No enlarged thyroid. LUNGS:  Clear to auscultation. HEART:  Cardiac rhythm is regular. S1, S2 are normal.  No murmur. ABDOMEN:  Moderately and diffusely distended, tympanic, soft. Bowel sounds are gurgly at times. The abdomen is, however, soft. No major tenderness. EXTREMITIES:  Normal.  No pedal edema, no clubbing, no tremor. NEUROLOGIC:  No focal neurological finding. He is alert and oriented. Moves all his four extremities. LABORATORY DATA:  Blood Tests:  Hemoglobin 13.4, on 09/30, was 14.3; WBC 6.9; normal differential.  Complete metabolic panel; we have a BUN of 21, creatinine 1. Normal LFTs, lipase. Urinalysis completely normal.  CT scan as I mentioned volvulus with significant upstream colonic dilatation. 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 He presented with a volvulus of the sigmoid with significant upstream colonic dilatation. He received two soap enemas which helped a little bit. We are going to do a decompressive colonoscopy, but eventually, he needs to have a partial sigmoid resection to decompress him and allow him to avoid having these problems in the future. For now, I have explained him the procedure of colonoscopy and the risks involved which include, but not limited to bleeding, perforation, or not being able to decompress him because of fecal contamination or technical difficulties. He agreed to proceed. We are going to put a decompression colonic tube to try to decompress his colon and give him adequate preparation in view of doing a semi-elective colonic surgery by Dr. Sammy Saleh. Beside this, he appears to be in excellent health.   Further notes will follow.       Rica FOY MD      [id number]  D:  10/09/2019 15:11  T:  10/09/2019 22:14  JOB #:  [id number]  CC:  MD Angelic Sears DO

## 2019-10-10 NOTE — H&P (VIEW-ONLY)
Surgery Consult Subjective:  
  
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 Patient also had a little bit of nausea this morning without any. He noticed this when he was out doing morning exercises. He has had similar issues in the past but not this severe. He is been afebrile. No chills or aches. No prior surgeries on his abdomen. No prior stomach issues. Has noted a decreased appetite. Normal fluid intake. Normal urination. Patient Active Problem List  
 Diagnosis Date Noted  Volvulus of sigmoid colon ([facility] [address] 10/09/2019  Constipation 10/09/2019  Abdominal pain 10/09/2019 History reviewed. No pertinent past medical history. Past Surgical History:  
Procedure Laterality Date  HX HEENT    
 wisdom teeth Social History Tobacco Use  Smoking status: Never Smoker  Smokeless tobacco: Never Used Substance Use Topics  Alcohol use: Never Frequency: Never History reviewed. No pertinent family history. Prior to Admission medications Medication Sig Start Date End Date Taking? Authorizing Provider  
ibuprofen (MOTRIN) 600 mg tablet Take 1 Tab by mouth every six (6) hours as needed for Pain. Take with food. 9/30/19  Yes Yayo Leal PA-C No Known Allergies Review of Systems: A comprehensive review of systems was negative except for that written in the History of Present Illness. Objective:  
 
Visit Vitals /69 (BP 1 Location: Right arm, BP Patient Position: At rest) Pulse 74 Temp 98.3 °F (36.8 °C) Resp 10 Ht 5' 10\" (1.778 m) Wt 75.3 kg (166 lb) SpO2 100% BMI 23.82 kg/m² Physical Exam:   
GENERAL: alert, cooperative, no distress, appears stated age, EYE: negative, LYMPH NODES: Cervical, supraclavicular, and axillary nodes normal. , THROAT & NECK: normal and no erythema or exudates noted. , LUNG: clear to auscultation bilaterally, HEART: regular rate and rhythm, ABDOMEN: distended, TTP without peritoneal signs, EXTREMITIES:  extremities normal, atraumatic, no cyanosis or edema, SKIN: Normal. and no rash or abnormalities, NEUROLOGIC: AOx3. Gait normal. Reflexes and motor strength normal and symmetric. Cranial nerves 2-12 and sensation grossly intact., PSYCH: non focal 
 
Imaging:  images and reports reviewed Lab Review:   
Recent Results (from the past 24 hour(s)) CBC WITH AUTOMATED DIFF Collection Time: 10/08/19 11:15 PM  
Result Value Ref Range WBC 6.9 4.6 - 13.2 K/uL  
 RBC 4.84 4.70 - 5.50 M/uL  
 HGB 13.8 13.0 - 16.0 g/dL HCT 42.1 36.0 - 48.0 % MCV 87.0 74.0 - 97.0 FL  
 MCH 28.5 24.0 - 34.0 PG  
 MCHC 32.8 31.0 - 37.0 g/dL  
 RDW 13.6 11.6 - 14.5 % PLATELET 810 796 - 067 K/uL MPV 9.4 9.2 - 11.8 FL  
 NEUTROPHILS 69 40 - 73 % LYMPHOCYTES 17 (L) 21 - 52 % MONOCYTES 12 (H) 3 - 10 % EOSINOPHILS 2 0 - 5 % BASOPHILS 0 0 - 2 %  
 ABS. NEUTROPHILS 4.7 1.8 - 8.0 K/UL  
 ABS. LYMPHOCYTES 1.2 0.9 - 3.6 K/UL  
 ABS. MONOCYTES 0.8 0.05 - 1.2 K/UL  
 ABS. EOSINOPHILS 0.2 0.0 - 0.4 K/UL  
 ABS. BASOPHILS 0.0 0.0 - 0.1 K/UL  
 DF AUTOMATED METABOLIC PANEL, COMPREHENSIVE Collection Time: 10/08/19 11:15 PM  
Result Value Ref Range Sodium 139 136 - 145 mmol/L Potassium 4.2 3.5 - 5.5 mmol/L Chloride 104 100 - 111 mmol/L  
 CO2 31 21 - 32 mmol/L Anion gap 4 3.0 - 18 mmol/L Glucose 92 74 - 99 mg/dL BUN 21 (H) 7.0 - 18 MG/DL Creatinine 1.00 0.6 - 1.3 MG/DL  
 BUN/Creatinine ratio 21 (H) 12 - 20 GFR est AA >60 >60 ml/min/1.73m2 GFR est non-AA >60 >60 ml/min/1.73m2 Calcium 9.0 8.5 - 10.1 MG/DL Bilirubin, total 0.8 0.2 - 1.0 MG/DL  
 ALT (SGPT) 29 16 - 61 U/L  
 AST (SGOT) 21 10 - 38 U/L Alk. phosphatase 57 45 - 117 U/L Protein, total 7.5 6.4 - 8.2 g/dL Albumin 4.0 3.4 - 5.0 g/dL Globulin 3.5 2.0 - 4.0 g/dL A-G Ratio 1.1 0.8 - 1.7 LIPASE Collection Time: 10/08/19 11:15 PM  
Result Value Ref Range Lipase 108 73 - 393 U/L  
URINALYSIS W/ RFLX MICROSCOPIC Collection Time: 10/08/19 11:15 PM  
Result Value Ref Range Color YELLOW Appearance CLOUDY Specific gravity 1.021 1.005 - 1.030    
 pH (UA) 7.0 5.0 - 8.0 Protein NEGATIVE  NEG mg/dL Glucose NEGATIVE  NEG mg/dL Ketone NEGATIVE  NEG mg/dL Bilirubin NEGATIVE  NEG Blood NEGATIVE  NEG Urobilinogen 1.0 0.2 - 1.0 EU/dL Nitrites NEGATIVE  NEG Leukocyte Esterase NEGATIVE  NEG    
DRUG SCREEN, URINE Collection Time: 10/08/19 11:15 PM  
Result Value Ref Range BENZODIAZEPINES NEGATIVE  NEG    
 BARBITURATES NEGATIVE  NEG    
 THC (TH-CANNABINOL) NEGATIVE  NEG    
 OPIATES NEGATIVE  NEG    
 PCP(PHENCYCLIDINE) NEGATIVE  NEG    
 COCAINE NEGATIVE  NEG    
 AMPHETAMINES NEGATIVE  NEG METHADONE NEGATIVE  NEG HDSCOM (NOTE) Assessment:  
 
Abdominal pain, suspect volvulus Plan: 1. I recommend proceeding with colonoscopic decompression. Then resection of volvulus 2. Discussed aspects of surgical intervention, methods, risks (including by not limited to infection, bleeding, hematoma, and perforation of the intestines or solid organs), and the risks of general anesthetic. The patient understands the risks; any and all questions were answered to the patient's satisfaction. Reji Briseno DO 
10/9/2019

## 2019-10-10 NOTE — CONSULTS
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 Patient also had a little bit of nausea this morning without any. He noticed this when he was out doing morning exercises. He has had similar issues in the past but not this severe. He is been afebrile. No chills or aches. No prior surgeries on his abdomen. No prior stomach issues. Has noted a decreased appetite. Normal fluid intake. Normal urination. Patient Active Problem List    Diagnosis Date Noted    Volvulus of sigmoid colon ([facility] [address] 10/09/2019    Constipation 10/09/2019    Abdominal pain 10/09/2019     History reviewed. No pertinent past medical history. Past Surgical History:   Procedure Laterality Date    HX HEENT      wisdom teeth      Social History     Tobacco Use    Smoking status: Never Smoker    Smokeless tobacco: Never Used   Substance Use Topics    Alcohol use: Never     Frequency: Never      History reviewed. No pertinent family history. Prior to Admission medications    Medication Sig Start Date End Date Taking? Authorizing Provider   ibuprofen (MOTRIN) 600 mg tablet Take 1 Tab by mouth every six (6) hours as needed for Pain. Take with food. 9/30/19  Yes Vik Ventura PA-C     No Known Allergies     Review of Systems:    A comprehensive review of systems was negative except for that written in the History of Present Illness.      Objective:     Visit Vitals  /69 (BP 1 Location: Right arm, BP Patient Position: At rest)   Pulse 74   Temp 98.3 °F (36.8 °C)   Resp 10   Ht 5' 10\" (1.778 m)   Wt 75.3 kg (166 lb)   SpO2 100%   BMI 23.82 kg/m²       Physical Exam:    GENERAL: alert, cooperative, no distress, appears stated age, EYE: negative, LYMPH NODES: Cervical, supraclavicular, and axillary nodes normal. , THROAT & NECK: normal and no erythema or exudates noted. , LUNG: clear to auscultation bilaterally, HEART: regular rate and rhythm, ABDOMEN: distended, TTP without peritoneal signs, EXTREMITIES:  extremities normal, atraumatic, no cyanosis or edema, SKIN: Normal. and no rash or abnormalities, NEUROLOGIC: AOx3. Gait normal. Reflexes and motor strength normal and symmetric. Cranial nerves 2-12 and sensation grossly intact., PSYCH: non focal    Imaging:  images and reports reviewed    Lab Review:    Recent Results (from the past 24 hour(s))   CBC WITH AUTOMATED DIFF    Collection Time: 10/08/19 11:15 PM   Result Value Ref Range    WBC 6.9 4.6 - 13.2 K/uL    RBC 4.84 4.70 - 5.50 M/uL    HGB 13.8 13.0 - 16.0 g/dL    HCT 42.1 36.0 - 48.0 %    MCV 87.0 74.0 - 97.0 FL    MCH 28.5 24.0 - 34.0 PG    MCHC 32.8 31.0 - 37.0 g/dL    RDW 13.6 11.6 - 14.5 %    PLATELET 449 710 - 176 K/uL    MPV 9.4 9.2 - 11.8 FL    NEUTROPHILS 69 40 - 73 %    LYMPHOCYTES 17 (L) 21 - 52 %    MONOCYTES 12 (H) 3 - 10 %    EOSINOPHILS 2 0 - 5 %    BASOPHILS 0 0 - 2 %    ABS. NEUTROPHILS 4.7 1.8 - 8.0 K/UL    ABS. LYMPHOCYTES 1.2 0.9 - 3.6 K/UL    ABS. MONOCYTES 0.8 0.05 - 1.2 K/UL    ABS. EOSINOPHILS 0.2 0.0 - 0.4 K/UL    ABS. BASOPHILS 0.0 0.0 - 0.1 K/UL    DF AUTOMATED     METABOLIC PANEL, COMPREHENSIVE    Collection Time: 10/08/19 11:15 PM   Result Value Ref Range    Sodium 139 136 - 145 mmol/L    Potassium 4.2 3.5 - 5.5 mmol/L    Chloride 104 100 - 111 mmol/L    CO2 31 21 - 32 mmol/L    Anion gap 4 3.0 - 18 mmol/L    Glucose 92 74 - 99 mg/dL    BUN 21 (H) 7.0 - 18 MG/DL    Creatinine 1.00 0.6 - 1.3 MG/DL    BUN/Creatinine ratio 21 (H) 12 - 20      GFR est AA >60 >60 ml/min/1.73m2    GFR est non-AA >60 >60 ml/min/1.73m2    Calcium 9.0 8.5 - 10.1 MG/DL    Bilirubin, total 0.8 0.2 - 1.0 MG/DL    ALT (SGPT) 29 16 - 61 U/L    AST (SGOT) 21 10 - 38 U/L    Alk.  phosphatase 57 45 - 117 U/L    Protein, total 7.5 6.4 - 8.2 g/dL    Albumin 4.0 3.4 - 5.0 g/dL    Globulin 3.5 2.0 - 4.0 g/dL    A-G Ratio 1.1 0.8 - 1.7     LIPASE Collection Time: 10/08/19 11:15 PM   Result Value Ref Range    Lipase 108 73 - 393 U/L   URINALYSIS W/ RFLX MICROSCOPIC    Collection Time: 10/08/19 11:15 PM   Result Value Ref Range    Color YELLOW      Appearance CLOUDY      Specific gravity 1.021 1.005 - 1.030      pH (UA) 7.0 5.0 - 8.0      Protein NEGATIVE  NEG mg/dL    Glucose NEGATIVE  NEG mg/dL    Ketone NEGATIVE  NEG mg/dL    Bilirubin NEGATIVE  NEG      Blood NEGATIVE  NEG      Urobilinogen 1.0 0.2 - 1.0 EU/dL    Nitrites NEGATIVE  NEG      Leukocyte Esterase NEGATIVE  NEG     DRUG SCREEN, URINE    Collection Time: 10/08/19 11:15 PM   Result Value Ref Range    BENZODIAZEPINES NEGATIVE  NEG      BARBITURATES NEGATIVE  NEG      THC (TH-CANNABINOL) NEGATIVE  NEG      OPIATES NEGATIVE  NEG      PCP(PHENCYCLIDINE) NEGATIVE  NEG      COCAINE NEGATIVE  NEG      AMPHETAMINES NEGATIVE  NEG      METHADONE NEGATIVE  NEG      HDSCOM (NOTE)        Assessment:     Abdominal pain, suspect volvulus    Plan:     1. I recommend proceeding with colonoscopic decompression. Then resection of volvulus    2. Discussed aspects of surgical intervention, methods, risks (including by not limited to infection, bleeding, hematoma, and perforation of the intestines or solid organs), and the risks of general anesthetic. The patient understands the risks; any and all questions were answered to the patient's satisfaction.     Jennifer Ojeda DO  10/9/2019

## 2019-10-10 NOTE — PROGRESS NOTES
Bedside and Verbal shift change report given to Carlos Garvey RN (oncoming nurse) by Pamela Bueno (offgoing nurse). Report included the following information SBAR, Kardex, Intake/Output, MAR and Recent Results.

## 2019-10-11 ENCOUNTER — ANESTHESIA (OUTPATIENT)
Dept: SURGERY | Age: 24
DRG: 330 | End: 2019-10-11
Payer: OTHER GOVERNMENT

## 2019-10-11 LAB
ABO + RH BLD: NORMAL
BLOOD GROUP ANTIBODIES SERPL: NORMAL
SPECIMEN EXP DATE BLD: NORMAL

## 2019-10-11 PROCEDURE — 77030008477 HC STYL SATN SLP COVD -A: Performed by: ANESTHESIOLOGY

## 2019-10-11 PROCEDURE — 74011250636 HC RX REV CODE- 250/636: Performed by: INTERNAL MEDICINE

## 2019-10-11 PROCEDURE — 77030040506 HC DRN WND MDII -A: Performed by: SURGERY

## 2019-10-11 PROCEDURE — 77030018836 HC SOL IRR NACL ICUM -A: Performed by: SURGERY

## 2019-10-11 PROCEDURE — 65270000029 HC RM PRIVATE

## 2019-10-11 PROCEDURE — 76060000040 HC ANESTHESIA 4.5 TO 5 HR: Performed by: SURGERY

## 2019-10-11 PROCEDURE — 0DNL0ZZ RELEASE TRANSVERSE COLON, OPEN APPROACH: ICD-10-PCS | Performed by: SURGERY

## 2019-10-11 PROCEDURE — 77030020782 HC GWN BAIR PAWS FLX 3M -B: Performed by: SURGERY

## 2019-10-11 PROCEDURE — 77030036731 HC STPLR ENDOSC J&J -F: Performed by: SURGERY

## 2019-10-11 PROCEDURE — 0DTJ0ZZ RESECTION OF APPENDIX, OPEN APPROACH: ICD-10-PCS | Performed by: SURGERY

## 2019-10-11 PROCEDURE — 74011250636 HC RX REV CODE- 250/636: Performed by: SURGERY

## 2019-10-11 PROCEDURE — 77030009979 HC RELD STPLR TCR J&J -C: Performed by: SURGERY

## 2019-10-11 PROCEDURE — 77030031140 HC SUT VCRL3 J&J -A: Performed by: SURGERY

## 2019-10-11 PROCEDURE — 77030031139 HC SUT VCRL2 J&J -A: Performed by: SURGERY

## 2019-10-11 PROCEDURE — 0DNM0ZZ RELEASE DESCENDING COLON, OPEN APPROACH: ICD-10-PCS | Performed by: SURGERY

## 2019-10-11 PROCEDURE — 74011000258 HC RX REV CODE- 258: Performed by: HOSPITALIST

## 2019-10-11 PROCEDURE — 76210000006 HC OR PH I REC 0.5 TO 1 HR: Performed by: SURGERY

## 2019-10-11 PROCEDURE — 74011000250 HC RX REV CODE- 250

## 2019-10-11 PROCEDURE — 0DBP0ZZ EXCISION OF RECTUM, OPEN APPROACH: ICD-10-PCS | Performed by: SURGERY

## 2019-10-11 PROCEDURE — 74011250636 HC RX REV CODE- 250/636

## 2019-10-11 PROCEDURE — 77030011267 HC ELECTRD BLD COVD -A: Performed by: SURGERY

## 2019-10-11 PROCEDURE — 76010000136 HC OR TIME 4.5 TO 5 HR: Performed by: SURGERY

## 2019-10-11 PROCEDURE — 77030011264 HC ELECTRD BLD EXT COVD -A: Performed by: SURGERY

## 2019-10-11 PROCEDURE — 77030008462 HC STPLR SKN PROX J&J -A: Performed by: SURGERY

## 2019-10-11 PROCEDURE — 77030002916 HC SUT ETHLN J&J -A: Performed by: SURGERY

## 2019-10-11 PROCEDURE — 77030002966 HC SUT PDS J&J -A: Performed by: SURGERY

## 2019-10-11 PROCEDURE — 88304 TISSUE EXAM BY PATHOLOGIST: CPT

## 2019-10-11 PROCEDURE — 0DTN0ZZ RESECTION OF SIGMOID COLON, OPEN APPROACH: ICD-10-PCS | Performed by: SURGERY

## 2019-10-11 PROCEDURE — 77030006643: Performed by: ANESTHESIOLOGY

## 2019-10-11 PROCEDURE — 86900 BLOOD TYPING SEROLOGIC ABO: CPT

## 2019-10-11 PROCEDURE — 0DNV0ZZ RELEASE MESENTERY, OPEN APPROACH: ICD-10-PCS | Performed by: SURGERY

## 2019-10-11 PROCEDURE — 74011000250 HC RX REV CODE- 250: Performed by: SURGERY

## 2019-10-11 PROCEDURE — 77030002986 HC SUT PROL J&J -A: Performed by: SURGERY

## 2019-10-11 PROCEDURE — 74011250637 HC RX REV CODE- 250/637: Performed by: SURGERY

## 2019-10-11 PROCEDURE — 74011000258 HC RX REV CODE- 258: Performed by: FAMILY MEDICINE

## 2019-10-11 PROCEDURE — 77030025303 HC STPLR ENDOSC J&J -G: Performed by: SURGERY

## 2019-10-11 PROCEDURE — 77030034850: Performed by: SURGERY

## 2019-10-11 PROCEDURE — 77030040361 HC SLV COMPR DVT MDII -B: Performed by: SURGERY

## 2019-10-11 PROCEDURE — 77030016154: Performed by: SURGERY

## 2019-10-11 PROCEDURE — C9290 INJ, BUPIVACAINE LIPOSOME: HCPCS | Performed by: SURGERY

## 2019-10-11 PROCEDURE — 77030013079 HC BLNKT BAIR HGGR 3M -A: Performed by: ANESTHESIOLOGY

## 2019-10-11 PROCEDURE — 77030003029 HC SUT VCRL J&J -B: Performed by: SURGERY

## 2019-10-11 PROCEDURE — 88307 TISSUE EXAM BY PATHOLOGIST: CPT

## 2019-10-11 PROCEDURE — 77030008683 HC TU ET CUF COVD -A: Performed by: ANESTHESIOLOGY

## 2019-10-11 PROCEDURE — 0DTG0ZZ RESECTION OF LEFT LARGE INTESTINE, OPEN APPROACH: ICD-10-PCS | Performed by: SURGERY

## 2019-10-11 PROCEDURE — 77030020407 HC IV BLD WRMR ST 3M -A: Performed by: ANESTHESIOLOGY

## 2019-10-11 PROCEDURE — 74011250636 HC RX REV CODE- 250/636: Performed by: FAMILY MEDICINE

## 2019-10-11 PROCEDURE — 36415 COLL VENOUS BLD VENIPUNCTURE: CPT

## 2019-10-11 PROCEDURE — 74011250637 HC RX REV CODE- 250/637: Performed by: ANESTHESIOLOGY

## 2019-10-11 PROCEDURE — 77030026102 HC DEV TISS ENSEAL G2 J&J -F: Performed by: SURGERY

## 2019-10-11 RX ORDER — PREGABALIN 75 MG/1
75 CAPSULE ORAL
Status: COMPLETED | OUTPATIENT
Start: 2019-10-11 | End: 2019-10-11

## 2019-10-11 RX ORDER — DEXTROSE MONOHYDRATE AND SODIUM CHLORIDE 5; .9 G/100ML; G/100ML
50 INJECTION, SOLUTION INTRAVENOUS CONTINUOUS
Status: DISCONTINUED | OUTPATIENT
Start: 2019-10-11 | End: 2019-10-16

## 2019-10-11 RX ORDER — NALOXONE HYDROCHLORIDE 0.4 MG/ML
0.1 INJECTION, SOLUTION INTRAMUSCULAR; INTRAVENOUS; SUBCUTANEOUS AS NEEDED
Status: CANCELLED | OUTPATIENT
Start: 2019-10-11

## 2019-10-11 RX ORDER — FLUMAZENIL 0.1 MG/ML
0.2 INJECTION INTRAVENOUS
Status: CANCELLED | OUTPATIENT
Start: 2019-10-11

## 2019-10-11 RX ORDER — DEXAMETHASONE SODIUM PHOSPHATE 4 MG/ML
INJECTION, SOLUTION INTRA-ARTICULAR; INTRALESIONAL; INTRAMUSCULAR; INTRAVENOUS; SOFT TISSUE AS NEEDED
Status: DISCONTINUED | OUTPATIENT
Start: 2019-10-11 | End: 2019-10-11 | Stop reason: HOSPADM

## 2019-10-11 RX ORDER — ONDANSETRON 2 MG/ML
INJECTION INTRAMUSCULAR; INTRAVENOUS AS NEEDED
Status: DISCONTINUED | OUTPATIENT
Start: 2019-10-11 | End: 2019-10-11 | Stop reason: HOSPADM

## 2019-10-11 RX ORDER — SODIUM CHLORIDE 0.9 % (FLUSH) 0.9 %
5-40 SYRINGE (ML) INJECTION AS NEEDED
Status: CANCELLED | OUTPATIENT
Start: 2019-10-11

## 2019-10-11 RX ORDER — ACETAMINOPHEN 500 MG
1000 TABLET ORAL
Status: COMPLETED | OUTPATIENT
Start: 2019-10-11 | End: 2019-10-11

## 2019-10-11 RX ORDER — EPHEDRINE SULFATE/0.9% NACL/PF 25 MG/5 ML
SYRINGE (ML) INTRAVENOUS AS NEEDED
Status: DISCONTINUED | OUTPATIENT
Start: 2019-10-11 | End: 2019-10-11 | Stop reason: HOSPADM

## 2019-10-11 RX ORDER — MIDAZOLAM HYDROCHLORIDE 1 MG/ML
INJECTION, SOLUTION INTRAMUSCULAR; INTRAVENOUS AS NEEDED
Status: DISCONTINUED | OUTPATIENT
Start: 2019-10-11 | End: 2019-10-11 | Stop reason: HOSPADM

## 2019-10-11 RX ORDER — ONDANSETRON 2 MG/ML
4 INJECTION INTRAMUSCULAR; INTRAVENOUS
Status: DISCONTINUED | OUTPATIENT
Start: 2019-10-11 | End: 2019-10-11

## 2019-10-11 RX ORDER — FENTANYL CITRATE 50 UG/ML
50 INJECTION, SOLUTION INTRAMUSCULAR; INTRAVENOUS AS NEEDED
Status: CANCELLED | OUTPATIENT
Start: 2019-10-11 | End: 2019-10-15

## 2019-10-11 RX ORDER — MAGNESIUM SULFATE 100 %
4 CRYSTALS MISCELLANEOUS AS NEEDED
Status: CANCELLED | OUTPATIENT
Start: 2019-10-11

## 2019-10-11 RX ORDER — SODIUM CHLORIDE 9 MG/ML
INJECTION, SOLUTION INTRAVENOUS
Status: DISCONTINUED | OUTPATIENT
Start: 2019-10-11 | End: 2019-10-11 | Stop reason: HOSPADM

## 2019-10-11 RX ORDER — GLYCOPYRROLATE 0.2 MG/ML
INJECTION INTRAMUSCULAR; INTRAVENOUS AS NEEDED
Status: DISCONTINUED | OUTPATIENT
Start: 2019-10-11 | End: 2019-10-11 | Stop reason: HOSPADM

## 2019-10-11 RX ORDER — DIPHENHYDRAMINE HYDROCHLORIDE 50 MG/ML
12.5 INJECTION, SOLUTION INTRAMUSCULAR; INTRAVENOUS
Status: DISCONTINUED | OUTPATIENT
Start: 2019-10-11 | End: 2019-10-18 | Stop reason: HOSPADM

## 2019-10-11 RX ORDER — LIDOCAINE HYDROCHLORIDE 20 MG/ML
INJECTION, SOLUTION EPIDURAL; INFILTRATION; INTRACAUDAL; PERINEURAL AS NEEDED
Status: DISCONTINUED | OUTPATIENT
Start: 2019-10-11 | End: 2019-10-11 | Stop reason: HOSPADM

## 2019-10-11 RX ORDER — LORATADINE 10 MG/1
10 TABLET ORAL DAILY PRN
Status: DISCONTINUED | OUTPATIENT
Start: 2019-10-11 | End: 2019-10-18 | Stop reason: HOSPADM

## 2019-10-11 RX ORDER — PROPOFOL 10 MG/ML
INJECTION, EMULSION INTRAVENOUS AS NEEDED
Status: DISCONTINUED | OUTPATIENT
Start: 2019-10-11 | End: 2019-10-11 | Stop reason: HOSPADM

## 2019-10-11 RX ORDER — ONDANSETRON 2 MG/ML
4 INJECTION INTRAMUSCULAR; INTRAVENOUS
Status: DISCONTINUED | OUTPATIENT
Start: 2019-10-11 | End: 2019-10-18 | Stop reason: HOSPADM

## 2019-10-11 RX ORDER — NEOSTIGMINE METHYLSULFATE 5 MG/5 ML
SYRINGE (ML) INTRAVENOUS AS NEEDED
Status: DISCONTINUED | OUTPATIENT
Start: 2019-10-11 | End: 2019-10-11 | Stop reason: HOSPADM

## 2019-10-11 RX ORDER — FENTANYL CITRATE 50 UG/ML
INJECTION, SOLUTION INTRAMUSCULAR; INTRAVENOUS AS NEEDED
Status: DISCONTINUED | OUTPATIENT
Start: 2019-10-11 | End: 2019-10-11 | Stop reason: HOSPADM

## 2019-10-11 RX ORDER — KETAMINE HYDROCHLORIDE 10 MG/ML
INJECTION, SOLUTION INTRAMUSCULAR; INTRAVENOUS AS NEEDED
Status: DISCONTINUED | OUTPATIENT
Start: 2019-10-11 | End: 2019-10-11 | Stop reason: HOSPADM

## 2019-10-11 RX ORDER — HYDROMORPHONE HYDROCHLORIDE 2 MG/ML
INJECTION, SOLUTION INTRAMUSCULAR; INTRAVENOUS; SUBCUTANEOUS AS NEEDED
Status: DISCONTINUED | OUTPATIENT
Start: 2019-10-11 | End: 2019-10-11 | Stop reason: HOSPADM

## 2019-10-11 RX ORDER — SODIUM CHLORIDE, SODIUM LACTATE, POTASSIUM CHLORIDE, CALCIUM CHLORIDE 600; 310; 30; 20 MG/100ML; MG/100ML; MG/100ML; MG/100ML
125 INJECTION, SOLUTION INTRAVENOUS CONTINUOUS
Status: DISCONTINUED | OUTPATIENT
Start: 2019-10-11 | End: 2019-10-11

## 2019-10-11 RX ORDER — METRONIDAZOLE 500 MG/100ML
500 INJECTION, SOLUTION INTRAVENOUS ONCE
Status: COMPLETED | OUTPATIENT
Start: 2019-10-11 | End: 2019-10-11

## 2019-10-11 RX ORDER — SODIUM CHLORIDE 0.9 % (FLUSH) 0.9 %
5-40 SYRINGE (ML) INJECTION EVERY 8 HOURS
Status: CANCELLED | OUTPATIENT
Start: 2019-10-11

## 2019-10-11 RX ORDER — HYDROMORPHONE HYDROCHLORIDE 2 MG/ML
0.5 INJECTION, SOLUTION INTRAMUSCULAR; INTRAVENOUS; SUBCUTANEOUS
Status: CANCELLED | OUTPATIENT
Start: 2019-10-11

## 2019-10-11 RX ORDER — DEXTROSE MONOHYDRATE 100 MG/ML
125-250 INJECTION, SOLUTION INTRAVENOUS AS NEEDED
Status: CANCELLED | OUTPATIENT
Start: 2019-10-11

## 2019-10-11 RX ORDER — MORPHINE SULFATE 2 MG/ML
2 INJECTION, SOLUTION INTRAMUSCULAR; INTRAVENOUS
Status: DISCONTINUED | OUTPATIENT
Start: 2019-10-11 | End: 2019-10-13

## 2019-10-11 RX ORDER — CELECOXIB 100 MG/1
200 CAPSULE ORAL
Status: COMPLETED | OUTPATIENT
Start: 2019-10-11 | End: 2019-10-11

## 2019-10-11 RX ORDER — ROCURONIUM BROMIDE 10 MG/ML
INJECTION, SOLUTION INTRAVENOUS AS NEEDED
Status: DISCONTINUED | OUTPATIENT
Start: 2019-10-11 | End: 2019-10-11 | Stop reason: HOSPADM

## 2019-10-11 RX ORDER — MORPHINE SULFATE 2 MG/ML
2 INJECTION, SOLUTION INTRAMUSCULAR; INTRAVENOUS ONCE
Status: DISCONTINUED | OUTPATIENT
Start: 2019-10-11 | End: 2019-10-11

## 2019-10-11 RX ADMIN — ROCURONIUM BROMIDE 10 MG: 10 INJECTION, SOLUTION INTRAVENOUS at 17:53

## 2019-10-11 RX ADMIN — CELECOXIB 200 MG: 100 CAPSULE ORAL at 15:23

## 2019-10-11 RX ADMIN — SODIUM CHLORIDE: 9 INJECTION, SOLUTION INTRAVENOUS at 15:35

## 2019-10-11 RX ADMIN — HEPARIN SODIUM 5000 UNITS: 5000 INJECTION INTRAVENOUS; SUBCUTANEOUS at 05:02

## 2019-10-11 RX ADMIN — GLYCOPYRROLATE 0.7 MG: 0.2 INJECTION INTRAMUSCULAR; INTRAVENOUS at 19:54

## 2019-10-11 RX ADMIN — DEXAMETHASONE SODIUM PHOSPHATE 4 MG: 4 INJECTION, SOLUTION INTRA-ARTICULAR; INTRALESIONAL; INTRAMUSCULAR; INTRAVENOUS; SOFT TISSUE at 16:31

## 2019-10-11 RX ADMIN — DEXTROSE MONOHYDRATE AND SODIUM CHLORIDE 100 ML/HR: 5; .9 INJECTION, SOLUTION INTRAVENOUS at 22:47

## 2019-10-11 RX ADMIN — FENTANYL CITRATE 100 MCG: 50 INJECTION, SOLUTION INTRAMUSCULAR; INTRAVENOUS at 15:42

## 2019-10-11 RX ADMIN — KETAMINE HYDROCHLORIDE 20 MG: 10 INJECTION, SOLUTION INTRAMUSCULAR; INTRAVENOUS at 16:02

## 2019-10-11 RX ADMIN — METRONIDAZOLE 1000 MG: 250 TABLET ORAL at 09:12

## 2019-10-11 RX ADMIN — ROCURONIUM BROMIDE 30 MG: 10 INJECTION, SOLUTION INTRAVENOUS at 17:12

## 2019-10-11 RX ADMIN — Medication 10 MG: at 16:42

## 2019-10-11 RX ADMIN — Medication 10 ML: at 22:00

## 2019-10-11 RX ADMIN — ROCURONIUM BROMIDE 10 MG: 10 INJECTION, SOLUTION INTRAVENOUS at 18:49

## 2019-10-11 RX ADMIN — ROCURONIUM BROMIDE 20 MG: 10 INJECTION, SOLUTION INTRAVENOUS at 19:09

## 2019-10-11 RX ADMIN — ROCURONIUM BROMIDE 20 MG: 10 INJECTION, SOLUTION INTRAVENOUS at 16:18

## 2019-10-11 RX ADMIN — ROCURONIUM BROMIDE 10 MG: 10 INJECTION, SOLUTION INTRAVENOUS at 18:32

## 2019-10-11 RX ADMIN — MORPHINE SULFATE 2 MG: 2 INJECTION, SOLUTION INTRAMUSCULAR; INTRAVENOUS at 22:47

## 2019-10-11 RX ADMIN — KETAMINE HYDROCHLORIDE 10 MG: 10 INJECTION, SOLUTION INTRAMUSCULAR; INTRAVENOUS at 17:32

## 2019-10-11 RX ADMIN — PREGABALIN 75 MG: 75 CAPSULE ORAL at 15:23

## 2019-10-11 RX ADMIN — Medication 3.8 MG: at 19:54

## 2019-10-11 RX ADMIN — KETAMINE HYDROCHLORIDE 20 MG: 10 INJECTION, SOLUTION INTRAMUSCULAR; INTRAVENOUS at 17:26

## 2019-10-11 RX ADMIN — KETAMINE HYDROCHLORIDE 10 MG: 10 INJECTION, SOLUTION INTRAMUSCULAR; INTRAVENOUS at 19:34

## 2019-10-11 RX ADMIN — ROCURONIUM BROMIDE 10 MG: 10 INJECTION, SOLUTION INTRAVENOUS at 16:46

## 2019-10-11 RX ADMIN — SODIUM CHLORIDE, SODIUM LACTATE, POTASSIUM CHLORIDE, AND CALCIUM CHLORIDE: 600; 310; 30; 20 INJECTION, SOLUTION INTRAVENOUS at 16:26

## 2019-10-11 RX ADMIN — SODIUM CHLORIDE, SODIUM LACTATE, POTASSIUM CHLORIDE, AND CALCIUM CHLORIDE: 600; 310; 30; 20 INJECTION, SOLUTION INTRAVENOUS at 18:44

## 2019-10-11 RX ADMIN — NEOMYCIN SULFATE 1000 MG: 500 TABLET ORAL at 09:15

## 2019-10-11 RX ADMIN — HYDROMORPHONE HYDROCHLORIDE 0.5 MG: 2 INJECTION, SOLUTION INTRAMUSCULAR; INTRAVENOUS; SUBCUTANEOUS at 19:51

## 2019-10-11 RX ADMIN — Medication 10 MG: at 16:37

## 2019-10-11 RX ADMIN — ROCURONIUM BROMIDE 10 MG: 10 INJECTION, SOLUTION INTRAVENOUS at 16:39

## 2019-10-11 RX ADMIN — ONDANSETRON 4 MG: 2 INJECTION INTRAMUSCULAR; INTRAVENOUS at 19:42

## 2019-10-11 RX ADMIN — ONDANSETRON 4 MG: 2 INJECTION INTRAMUSCULAR; INTRAVENOUS at 15:37

## 2019-10-11 RX ADMIN — WATER 1 G: 1 INJECTION INTRAMUSCULAR; INTRAVENOUS; SUBCUTANEOUS at 15:56

## 2019-10-11 RX ADMIN — KETAMINE HYDROCHLORIDE 20 MG: 10 INJECTION, SOLUTION INTRAMUSCULAR; INTRAVENOUS at 18:55

## 2019-10-11 RX ADMIN — PROPOFOL 180 MG: 10 INJECTION, EMULSION INTRAVENOUS at 15:44

## 2019-10-11 RX ADMIN — ROCURONIUM BROMIDE 50 MG: 10 INJECTION, SOLUTION INTRAVENOUS at 15:44

## 2019-10-11 RX ADMIN — METRONIDAZOLE 500 MG: 500 INJECTION, SOLUTION INTRAVENOUS at 16:06

## 2019-10-11 RX ADMIN — ACETAMINOPHEN 1000 MG: 500 TABLET ORAL at 15:23

## 2019-10-11 RX ADMIN — DEXTROSE MONOHYDRATE AND SODIUM CHLORIDE 100 ML/HR: 5; .9 INJECTION, SOLUTION INTRAVENOUS at 10:42

## 2019-10-11 RX ADMIN — MIDAZOLAM HYDROCHLORIDE 2 MG: 1 INJECTION, SOLUTION INTRAMUSCULAR; INTRAVENOUS at 15:35

## 2019-10-11 RX ADMIN — LIDOCAINE HYDROCHLORIDE 100 MG: 20 INJECTION, SOLUTION EPIDURAL; INFILTRATION; INTRACAUDAL; PERINEURAL at 15:44

## 2019-10-11 NOTE — PROGRESS NOTES
4894 -  Bedside and verbal shift change report given to Judy Hernandez RN (on coming nurse) by SURNIDER Golden RN (off going nurse). Report included the following information SBAR, Kardex, OR Summary, Intake/Output and MAR.    1225 -  Pre-op preparation completed. Morning oral care, chlorhexadine wipes, Nozin, Linen & gown changed. 1419 -  Pt. Out to OR.    1930 -  Bedside and verbal shift change report given by PATRICIA Mike (off going nurse) to SURINDER Malhotra (on coming nurse). Report included the following information SBAR, Kardex, OR Summary, Intake/Output and MAR. Pt still in OR at this time.

## 2019-10-11 NOTE — ANESTHESIA PREPROCEDURE EVALUATION
Relevant Problems   No relevant active problems       Anesthetic History   No history of anesthetic complications            Review of Systems / Medical History  Patient summary reviewed, nursing notes reviewed and pertinent labs reviewed    Pulmonary  Within defined limits                 Neuro/Psych   Within defined limits           Cardiovascular  Within defined limits                Exercise tolerance: >4 METS     GI/Hepatic/Renal                Endo/Other  Within defined limits           Other Findings              Physical Exam    Airway  Mallampati: II    Neck ROM: normal range of motion   Mouth opening: Normal     Cardiovascular               Dental  No notable dental hx       Pulmonary                 Abdominal  GI exam deferred       Other Findings            Anesthetic Plan    ASA: 1  Anesthesia type: general          Induction: Intravenous  Anesthetic plan and risks discussed with: Patient

## 2019-10-11 NOTE — INTERVAL H&P NOTE
H&P Update: 
Fernando Buchanan was seen and examined. History and physical has been reviewed. The patient has been examined. There have been no significant clinical changes since the completion of the originally dated History and Physical. 
Patient identified by surgeon; surgical site was confirmed by patient and surgeon.

## 2019-10-11 NOTE — PROGRESS NOTES
Reviewed CT scan at length with Dr. Amanda Machuca. Question from me was is there malrotation of the intestie? Dr. Amanda Machuca did not feel the duodenum was on the right and he believed that the SMA/SMV were in the correct location. The cecum was in the pelvis with severe sigmoid volvulus. I will check to confirm this operatively and if malrotation is seen a Jackson's procedure will be performed. If colon has non-fixation the appendix will be removed and volvulus resected.     Melven Lorraine, DO

## 2019-10-11 NOTE — PROGRESS NOTES
Significant discussion of recommendation of resection of recurrent volvulus with obstruction and abdominal pain. RBA of surgery as well as delaying surgery discussed. Pt has significant concerns due to the consequences to his [address] [facility]. Also called pt's commanding officer who also discussed concerns and issues with pt. 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 Ostomy requirement also discussed. After pt had conversation with command he decided to move forward with surgery understanding the RBA and possible adverse effect on [address] [facility]. Will give antibx prep and perform surgery tomorrow afternoon.

## 2019-10-11 NOTE — PROGRESS NOTES
2100: Pt visited by people from his unit. 2200: Orders from Dr Jhonatan Lauren for antibiotics. Pt questioned antibiotics and expressed being nervous about surgery tomorrow. No reports of pain other than from the injection sites of the heparin. Shift summary: Pt anxious about surgery. Pt had visitors briefly. Pt slept throughout the night with no complaints of pain. Patient Vitals for the past 12 hrs:   Temp Pulse Resp BP SpO2   10/11/19 0316 98.2 °F (36.8 °C) 79 16 108/61 99 %   10/11/19 0038     99 %   10/10/19 2339 97.6 °F (36.4 °C) 85 18 111/62 99 %   10/10/19 1902 98.1 °F (36.7 °C) 65 16 131/87 100 %     Bedside and Verbal shift change report given to Niyah Orantes RN (oncoming nurse) by Park Osler RN (offgoing nurse). Report included the following information SBAR, MAR and Recent Results.

## 2019-10-11 NOTE — PROGRESS NOTES
Problem: Major Small and Large Bowel Procedure: Day of Surgery (Initiate SCIP Measures for Post-Op Care)  Goal: Activity/Safety  Outcome: Progressing Towards Goal  Goal: Nutrition/Diet  Outcome: Progressing Towards Goal  Goal: Medications  Outcome: Progressing Towards Goal

## 2019-10-11 NOTE — PROGRESS NOTES
1520  Bedside and Verbal shift change report given by Julio Marshall (off going nurse) to Sherrill Collado RN (on coming). Report included the following information SBAR, Kardex, OR Summary, Intake/Output and MAR.     1930  Bedside and Verbal shift change report given to SURINDER Malhotra RN (on coming nurse) by Sherrill Collado RN (off going). Report included the following information SBAR, Kardex, OR Summary, Intake/Output and MAR.      Pt is to be NPO at midnight

## 2019-10-11 NOTE — PROGRESS NOTES
Noted plan for surgical intervention later today. Please encourage ambulation as appropriate following surgical intervention to assist with identifying potential transition of care. No needs have been identifed at this time. CM remains available. Care Management Interventions  Mode of Transport at Discharge:  Other (see comment)()  Transition of Care Consult (CM Consult): Discharge Planning  Health Maintenance Reviewed: Yes  Confirm Follow Up Transport: Self  Plan discussed with Pt/Family/Caregiver: Yes  Discharge Location  Discharge Placement: Home with family assistance

## 2019-10-11 NOTE — PROGRESS NOTES
Hospitalist Progress Note    Patient: Grant Leon MRN: [id number]  CSN: [id number]    YOB: 1995  Age: 30 y.o. Sex: male    DOA: 10/8/2019 LOS:  LOS: 2 days          Chief Complaint:    Abdominal pain      Assessment/Plan     Sigmoid volvulus s/p decompression    For planned resection surgery today  Pre op antibitoics  NPO, change to dextrose NS IV for maintenance    Plan post op CBC and CMP tomorrow am    Discussed with aptient  He has notified his family in [facility] and his commander knows the [place] also as well as his colleagues who will visit today  Disposition :  Patient Active Problem List   Diagnosis Code    Volvulus of sigmoid colon ([facility] [address] K56.2    Constipation K59.00    Abdominal pain R10.9       Subjective:    Denies new issue  Tearful and stressed about his need for surgery    Review of systems:      Respiratory: denies SOB  Cardiovascular: denies chest pain  Gastrointestinal: denies abd pain, nausea, vomiting, diarrhea      Vital signs/Intake and Output:  Visit Vitals  /61 (BP 1 Location: Left arm, BP Patient Position: At rest)   Pulse 79   Temp 98.2 °F (36.8 °C)   Resp 16   Ht 5' 10\" (1.778 m)   Wt 77.7 kg (171 lb 6.4 oz)   SpO2 99%   BMI 24.59 kg/m²     Current Shift:  No intake/output data recorded.   Last three shifts:  10/09 1901 - 10/11 0700  In: 240 [P.O.:240]  Out: -     Exam:    General: Well developed, alert, NAD, OX3  CVS:Regular rate and rhythm, no M/R/G, S1/S2 heard, no thrill  Lungs:Clear to auscultation bilaterally, no wheezes, rhonchi, or rales  Abdomen: Soft, Nontender, No distention, Normal Bowel sounds, No hepatomegaly  Extremities: No C/C/E, pulses palpable 2+  Neuro:grossly normal , follows commands  Psych:appropriate                Labs: Results:       Chemistry Recent Labs     10/08/19  2315   GLU 92      K 4.2      CO2 31   BUN 21*   CREA 1.00   CA 9.0   AGAP 4   BUCR 21*   AP 57   TP 7.5   ALB 4.0   GLOB 3.5   AGRAT 1.1      CBC w/Diff Recent Labs     10/08/19  2315   WBC 6.9   RBC 4.84   HGB 13.8   HCT 42.1      GRANS 69   LYMPH 17*   EOS 2      Cardiac Enzymes No results for input(s): CPK, CKND1, VERONICA in the last 72 hours. No lab exists for component: CKRMB, TROIP   Coagulation No results for input(s): PTP, INR, APTT, INREXT in the last 72 hours. Lipid Panel No results found for: CHOL, CHOLPOCT, CHOLX, CHLST, CHOLV, 022486, HDL, HDLP, LDL, LDLC, DLDLP, 313105, VLDLC, VLDL, TGLX, TRIGL, TRIGP, TGLPOCT, CHHD, CHHDX   BNP No results for input(s): BNPP in the last 72 hours.    Liver Enzymes Recent Labs     10/08/19  2315   TP 7.5   ALB 4.0   AP 57   SGOT 21      Thyroid Studies No results found for: T4, T3U, TSH, TSHEXT     Procedures/imaging: see electronic medical records for all procedures/Xrays and details which were not copied into this note but were reviewed prior to creation of Eulalia Oglesby MD

## 2019-10-12 ENCOUNTER — APPOINTMENT (OUTPATIENT)
Dept: CT IMAGING | Age: 24
DRG: 330 | End: 2019-10-12
Attending: FAMILY MEDICINE
Payer: OTHER GOVERNMENT

## 2019-10-12 LAB
ALBUMIN SERPL-MCNC: 3.2 G/DL (ref 3.4–5)
ALBUMIN SERPL-MCNC: 3.4 G/DL (ref 3.4–5)
ALBUMIN/GLOB SERPL: 1.1 {RATIO} (ref 0.8–1.7)
ALBUMIN/GLOB SERPL: 1.1 {RATIO} (ref 0.8–1.7)
ALP SERPL-CCNC: 44 U/L (ref 45–117)
ALP SERPL-CCNC: 48 U/L (ref 45–117)
ALT SERPL-CCNC: 19 U/L (ref 16–61)
ALT SERPL-CCNC: 19 U/L (ref 16–61)
ANION GAP SERPL CALC-SCNC: 4 MMOL/L (ref 3–18)
ANION GAP SERPL CALC-SCNC: 6 MMOL/L (ref 3–18)
AST SERPL-CCNC: 17 U/L (ref 10–38)
AST SERPL-CCNC: 21 U/L (ref 10–38)
BILIRUB SERPL-MCNC: 0.9 MG/DL (ref 0.2–1)
BILIRUB SERPL-MCNC: 0.9 MG/DL (ref 0.2–1)
BUN SERPL-MCNC: 10 MG/DL (ref 7–18)
BUN SERPL-MCNC: 9 MG/DL (ref 7–18)
BUN/CREAT SERPL: 10 (ref 12–20)
BUN/CREAT SERPL: 11 (ref 12–20)
CALCIUM SERPL-MCNC: 7.7 MG/DL (ref 8.5–10.1)
CALCIUM SERPL-MCNC: 8 MG/DL (ref 8.5–10.1)
CHLORIDE SERPL-SCNC: 105 MMOL/L (ref 100–111)
CHLORIDE SERPL-SCNC: 106 MMOL/L (ref 100–111)
CO2 SERPL-SCNC: 26 MMOL/L (ref 21–32)
CO2 SERPL-SCNC: 29 MMOL/L (ref 21–32)
CREAT SERPL-MCNC: 0.91 MG/DL (ref 0.6–1.3)
CREAT SERPL-MCNC: 0.92 MG/DL (ref 0.6–1.3)
ERYTHROCYTE [DISTWIDTH] IN BLOOD BY AUTOMATED COUNT: 13.3 % (ref 11.6–14.5)
GLOBULIN SER CALC-MCNC: 2.9 G/DL (ref 2–4)
GLOBULIN SER CALC-MCNC: 3.2 G/DL (ref 2–4)
GLUCOSE SERPL-MCNC: 120 MG/DL (ref 74–99)
GLUCOSE SERPL-MCNC: 150 MG/DL (ref 74–99)
HCT VFR BLD AUTO: 40.2 % (ref 36–48)
HGB BLD-MCNC: 13.3 G/DL (ref 13–16)
MAGNESIUM SERPL-MCNC: 1.6 MG/DL (ref 1.6–2.6)
MCH RBC QN AUTO: 28.6 PG (ref 24–34)
MCHC RBC AUTO-ENTMCNC: 33.1 G/DL (ref 31–37)
MCV RBC AUTO: 86.5 FL (ref 74–97)
PLATELET # BLD AUTO: 201 K/UL (ref 135–420)
PMV BLD AUTO: 9.5 FL (ref 9.2–11.8)
POTASSIUM SERPL-SCNC: 4.1 MMOL/L (ref 3.5–5.5)
POTASSIUM SERPL-SCNC: 4.5 MMOL/L (ref 3.5–5.5)
PROT SERPL-MCNC: 6.1 G/DL (ref 6.4–8.2)
PROT SERPL-MCNC: 6.6 G/DL (ref 6.4–8.2)
RBC # BLD AUTO: 4.65 M/UL (ref 4.7–5.5)
SODIUM SERPL-SCNC: 137 MMOL/L (ref 136–145)
SODIUM SERPL-SCNC: 139 MMOL/L (ref 136–145)
WBC # BLD AUTO: 17.4 K/UL (ref 4.6–13.2)

## 2019-10-12 PROCEDURE — 70450 CT HEAD/BRAIN W/O DYE: CPT

## 2019-10-12 PROCEDURE — 74011250636 HC RX REV CODE- 250/636: Performed by: SURGERY

## 2019-10-12 PROCEDURE — 65270000029 HC RM PRIVATE

## 2019-10-12 PROCEDURE — 36415 COLL VENOUS BLD VENIPUNCTURE: CPT

## 2019-10-12 PROCEDURE — 74011000258 HC RX REV CODE- 258: Performed by: FAMILY MEDICINE

## 2019-10-12 PROCEDURE — 80053 COMPREHEN METABOLIC PANEL: CPT

## 2019-10-12 PROCEDURE — 74011250636 HC RX REV CODE- 250/636: Performed by: FAMILY MEDICINE

## 2019-10-12 PROCEDURE — 83735 ASSAY OF MAGNESIUM: CPT

## 2019-10-12 PROCEDURE — 85027 COMPLETE CBC AUTOMATED: CPT

## 2019-10-12 PROCEDURE — 77010033678 HC OXYGEN DAILY

## 2019-10-12 RX ORDER — HYDROMORPHONE HYDROCHLORIDE 1 MG/ML
1 INJECTION, SOLUTION INTRAMUSCULAR; INTRAVENOUS; SUBCUTANEOUS
Status: DISCONTINUED | OUTPATIENT
Start: 2019-10-12 | End: 2019-10-13

## 2019-10-12 RX ORDER — CALCIUM GLUCONATE 20 MG/ML
1 INJECTION, SOLUTION INTRAVENOUS ONCE
Status: DISCONTINUED | OUTPATIENT
Start: 2019-10-12 | End: 2019-10-12 | Stop reason: SDUPTHER

## 2019-10-12 RX ORDER — MAGNESIUM SULFATE HEPTAHYDRATE 40 MG/ML
2 INJECTION, SOLUTION INTRAVENOUS ONCE
Status: COMPLETED | OUTPATIENT
Start: 2019-10-12 | End: 2019-10-12

## 2019-10-12 RX ADMIN — Medication 5 ML: at 22:00

## 2019-10-12 RX ADMIN — MAGNESIUM SULFATE HEPTAHYDRATE 2 G: 40 INJECTION, SOLUTION INTRAVENOUS at 06:49

## 2019-10-12 RX ADMIN — CALCIUM GLUCONATE: 94 INJECTION, SOLUTION INTRAVENOUS at 05:38

## 2019-10-12 RX ADMIN — MORPHINE SULFATE 2 MG: 2 INJECTION, SOLUTION INTRAMUSCULAR; INTRAVENOUS at 02:47

## 2019-10-12 RX ADMIN — HYDROMORPHONE HYDROCHLORIDE 1 MG: 1 INJECTION, SOLUTION INTRAMUSCULAR; INTRAVENOUS; SUBCUTANEOUS at 17:54

## 2019-10-12 RX ADMIN — MORPHINE SULFATE 2 MG: 2 INJECTION, SOLUTION INTRAMUSCULAR; INTRAVENOUS at 21:02

## 2019-10-12 RX ADMIN — HYDROMORPHONE HYDROCHLORIDE 1 MG: 1 INJECTION, SOLUTION INTRAMUSCULAR; INTRAVENOUS; SUBCUTANEOUS at 12:40

## 2019-10-12 RX ADMIN — DEXTROSE MONOHYDRATE AND SODIUM CHLORIDE 100 ML/HR: 5; .9 INJECTION, SOLUTION INTRAVENOUS at 10:16

## 2019-10-12 RX ADMIN — DEXTROSE MONOHYDRATE AND SODIUM CHLORIDE 100 ML/HR: 5; .9 INJECTION, SOLUTION INTRAVENOUS at 20:46

## 2019-10-12 RX ADMIN — MORPHINE SULFATE 2 MG: 2 INJECTION, SOLUTION INTRAMUSCULAR; INTRAVENOUS at 08:17

## 2019-10-12 RX ADMIN — MORPHINE SULFATE 2 MG: 2 INJECTION, SOLUTION INTRAMUSCULAR; INTRAVENOUS at 15:54

## 2019-10-12 RX ADMIN — Medication 10 ML: at 15:54

## 2019-10-12 NOTE — PROGRESS NOTES
TRANSFER - IN REPORT:    Verbal report received from Johanna Simons RN(name) on Ezekiel Jones  being received from [place]) for routine progression of care      Report consisted of patients Situation, Background, Assessment and   Recommendations(SBAR). Information from the following report(s) SBAR, OR Summary, Recent Results was reviewed with the receiving nurse. Opportunity for questions and clarification was provided. Assessment completed upon patients arrival to unit and care assumed.

## 2019-10-12 NOTE — PROGRESS NOTES
Hospitalist Progress Note    Patient: Lattie Class MRN: [id number]  CSN: [id number]    YOB: 1995  Age: 30 y.o. Sex: male    DOA: 10/8/2019 LOS:  LOS: 3 days                Assessment and Plan:    Principal Problem:    Volvulus of sigmoid colon ([address] (10/9/2019)    Active Problems:    Constipation (10/9/2019)      Abdominal pain (10/9/2019)        He is s/p surgery  The paresthesias in his right hand are getting better and likely from positioning. Will watch       Chief complaint:  Abdominal pain and constipation        Subjective:    He feels a little better. Fingers ae less numb        Review of systems:    General: No fevers or chills. Cardiovascular: No chest pain or pressure. No palpitations. Pulmonary: No shortness of breath. Gastrointestinal: No nausea, vomiting. Objective:    Vital signs/Intake and Output:  Visit Vitals  /59 (BP 1 Location: Right arm, BP Patient Position: At rest)   Pulse 75   Temp 98.3 °F (36.8 °C)   Resp 16   Ht 5' 10\" (1.778 m)   Wt 77.7 kg (171 lb 6.4 oz)   SpO2 97%   BMI 24.59 kg/m²     Current Shift:  10/12 0701 - 10/12 1900  In: -   Out: 3552 [Urine:2250; Drains:20]  Last three shifts:  10/10 1901 - 10/12 0700  In: 3464 [I.V.:3813]  Out: 1365 [Urine:1135; Drains:80]    Physical Exam:  General: NAD, AAOx3. Non-toxic. HEENT: NC/AT. PERRLA, EOMI.  MMM. Lungs: Nml inspection. CTA B/L. No wheezing, rales or rhonchi. Heart:  S1S2 RRR,  PMI mid 5th IC space. No M/RG. Abdomen: Soft, NT/ND.  BS+. No peritoneal signs. Extremities: No C/C/E. Psych:   Nml affect. Neurologic:  2-12 intact. Strength 5/5 throughout.   Sensation symmetrical.          Labs: Results:       Chemistry Recent Labs     10/12/19  1138 10/12/19  0318   * 150*    137   K 4.1 4.5    105   CO2 29 26   BUN 9 10   CREA 0.92 0.91   CA 8.0* 7.7*   AGAP 4 6   BUCR 10* 11*   AP 48 44*   TP 6.6 6.1*   ALB 3.4 3.2*   GLOB 3.2 2.9   AGRAT 1.1 1.1      CBC w/Diff Recent Labs     10/12/19  0318   WBC 17.4*   RBC 4.65*   HGB 13.3   HCT 40.2         Cardiac Enzymes No results for input(s): CPK, CKND1, VERONICA in the last 72 hours. No lab exists for component: CKRMB, TROIP   Coagulation No results for input(s): PTP, INR, APTT, INREXT in the last 72 hours. Lipid Panel No results found for: CHOL, CHOLPOCT, CHOLX, CHLST, CHOLV, 058790, HDL, HDLP, LDL, LDLC, DLDLP, 499257, VLDLC, VLDL, TGLX, TRIGL, TRIGP, TGLPOCT, CHHD, CHHDX   BNP No results for input(s): BNPP in the last 72 hours.    Liver Enzymes Recent Labs     10/12/19  1138   TP 6.6   ALB 3.4   AP 48   SGOT 21      Thyroid Studies No results found for: T4, T3U, TSH, TSHEXT     Procedures/imaging: see electronic medical records for all procedures/Xrays and details which were not copied into this note but were reviewed prior to creation of Plan

## 2019-10-12 NOTE — PROGRESS NOTES
Received bedside report from night shift RN. Patient resting quietly in bed. Pain rated 4/10 in abdomen. Midline incision clean/dry/intact. JULIO drain filled with air, night shift RN stated it periodically would fill with air, she stated she informed the hospitalist. Drain recharged. Patient alert and oriented x 4. Thumb, index, and middle finger numb still. 9227 Morphine given for pain. Reassessed and patient stated some relief, non drowsy. 1130 Dr Criselda Mueller in to assess patient, stated patient can be on clears, d/c Sharma, and add Dilaudid 1 mg Q2H PRN, continue current IV fluid. Sharma removed. Patient has voided. Patient tolerating liquid diet well. Patient ambulated just outside hallway and back. Stated he was starting to feel dizzy. Vital signs stable, will continue to monitor. 1240 Dilaudid given for abd pain. Reassessed and patient stated relief. Alert and oriented x 4, non drowsy. 1330 Dr Grace Low in to assess patient. 1554 Morphine given for pain. Reassessed and patient stated some relief, non drowsy. 1754 Dilaudid given for abd pain. Reassessed and patient stated relief, non drowsy. Patient resting in bed throughout shift, ambulated to restroom without assistance. Patient Vitals for the past 12 hrs:   Temp Pulse Resp BP SpO2   10/12/19 1527 98.2 °F (36.8 °C) 86 16 109/66 97 %   10/12/19 1241  75  106/59 97 %   10/12/19 1202 98.3 °F (36.8 °C) 86 16 141/73 96 %   10/12/19 0911    131/57 98 %   10/12/19 0809 98.2 °F (36.8 °C) 91 15 90/63 97 %     Bedside and Verbal shift change report given to Loulou Medina RN (oncoming nurse) by Chema Ellis RN (offgoing nurse). Report included the following information SBAR, Kardex, Intake/Output, MAR and Recent Results.

## 2019-10-12 NOTE — ANESTHESIA POSTPROCEDURE EVALUATION
Post-Anesthesia Evaluation and Assessment    Cardiovascular Function/Vital Signs  Visit Vitals  /72   Pulse 90   Temp 37.2 °C (98.9 °F)   Resp 12   Ht 5' 10\" (1.778 m)   Wt 77.7 kg (171 lb 6.4 oz)   SpO2 100%   BMI 24.59 kg/m²       Patient is status post Procedure(s):  OPEN COLON RESECTION - RESECTION OF COLONIC VOLVULUS, LADDS PROCEDURE, APPENDECTOMY. Nausea/Vomiting: Controlled. Postoperative hydration reviewed and adequate. Pain:  Pain Scale 1: FLACC (10/11/19 2021)  Pain Intensity 1: 0 (10/11/19 2021)   Managed. Neurological Status:   Neuro (WDL): Within Defined Limits (10/11/19 2021)   At baseline. Mental Status and Level of Consciousness: Arousable. Pulmonary Status:   O2 Device: Non-rebreather mask (10/11/19 2021)   Adequate oxygenation and airway patent. Complications related to anesthesia: None    Post-anesthesia assessment completed. No concerns. Patient has met all discharge requirements.     Signed By: Alexandra Velasco MD    October 11, 2019

## 2019-10-12 NOTE — PROGRESS NOTES
Head CT negative, likely peripheral neuropathy due to position. Teleneuro consulted. Will wrap towel around the elbow to help cushion it. Calcium also came back low so gave 1 g calcium gluconate. Will check magnesium as well.

## 2019-10-12 NOTE — BRIEF OP NOTE
BRIEF OPERATIVE NOTE    Date of Procedure: 10/11/2019   Preoperative Diagnosis: Colonic volvulus ([facility] [address] [K56.2]  Postoperative Diagnosis: sigmoid volvulus, intestinal malrotation    Procedure(s):  OPEN COLON RESECTION - RESECTION OF COLONIC VOLVULUS, LADDS PROCEDURE, APPENDECTOMY  Surgeon(s) and Role:     Charletta Phalen, DO - Primary    Surgical Staff:  Darlyn Randles: Dorota Vee RN  Circ-Relief: Irina Ricketts RN; Leona Millan RN  Scrub Tech-1: Sonia REBOLLEDO  Scrub Tech-Relief: Norma BENITEZ  Scrub RN-1: Javier Chu RN  Surg Asst-1: Ana Saenz  Surg Asst-Relief: Marlyse Alpers Event Time In Time Out   Incision Start 10/11/2019 1616    Incision Close 10/11/2019 1959      Anesthesia: General   Estimated Blood Loss: 100cc  Specimens:   ID Type Source Tests Collected by Time Destination   1 : APPENDIX Preservative Appendix  Tyeshacharlie Gimenez, DO 10/11/2019 1853 Pathology   2 : PROXIMAL AND DISTAL DONUTS Preservative Other                  Tyeshacharlie Gimenez, DO 10/11/2019 1859 Pathology   3 : SIGMOID AND UPPER RECTUM Preservative Sigmoid  Tyeshacharlie Gimenez, DO 10/11/2019 1938 Pathology   4 : LEFT COLON Preservative Colon, Left  Tyeshacharlie Gimenez, DO 10/11/2019 1939 Pathology      Findings: malrotation, Luiza's bands, LOT on right, non-fixation of cecum and ascending colon, non-fixation of left and sigmoid, luiza's bands of mid descending colon to midline - very thick and wide. Volvulus of left colon between splenic flexure and bands and second severe volvulus of sigmoid and rectum with iris dilation 15cm.    Complications: none  Implants: * No implants in log *

## 2019-10-12 NOTE — PROGRESS NOTES
Pt transported to room 306. Chris Francis RN is at bedside. Pt skin assessment performed and nothing new noted at this time. Pt dressings CDI at this time. Pt NAD at this time. Visit Vitals  BP (P) 125/63   Pulse (P) 86   Temp (P) 98.9 °F (37.2 °C)   Resp (P) 16   Ht 5' 10\" (1.778 m)   Wt 77.7 kg (171 lb 6.4 oz)   SpO2 (P) 98%   BMI 24.59 kg/m²     Date 10/10/19 1900 - 10/11/19 0659 10/11/19 0700 - 10/12/19 0659   Shift 5143-5419 24 Hour Total 0292-2343 3888-9852 24 Hour Total   INTAKE   P.O.  240 0  0     P. O.  240 0  0   I. V.(mL/kg/hr) 0 0 3313(3.6) 500 3813     I.V.   1313  1313     Volume (0.9% sodium chloride infusion) 0 0        Volume (lactated Ringers infusion)   0900 177 9839     Volume (0.9% sodium chloride infusion)   1000  1000   Shift Total(mL/kg) 0(0) 240(3.1) 3313(42.6) 500(6.4) 3813(49)   OUTPUT   Urine(mL/kg/hr)   450(0.5) 85 535     Urine Occurrence(s)  2 x 1 x  1 x     Urine Output   450 85 535   Drains    20 20     Output (ml) (Basilio-Chappell Drain 10/11/19 Outer;Right Abdomen)    20 20   Other   100 25 125     Other Output   100 25 125   Blood    25 25     Estimated Blood Loss    25 25   Shift Total(mL/kg)   550(7.1) 155(2) 705(9.1)   NET 0 240 2763 345 3108   Weight (kg) 77.7 77.7 77.7 77.7 77.7

## 2019-10-12 NOTE — PROGRESS NOTES
RN stated that pt has had numbness/tingling in L arm/hand and decreased handgrip since arriving on the floor at 2100 last night. No pain in his arm or other symptoms. The patient states that his symptoms are worsening. Neuro exam reveals 4/5 strength in UE, normal cranial nerve exam, and objectively decreased sensation below the elbow. STAT head CT ordered to r/o stroke.

## 2019-10-12 NOTE — PERIOP NOTES
TRANSFER - OUT REPORT:    Verbal report given to William Carrion on [facility] [employer]  being transferred to [place] for routine post - op       Report consisted of patients Situation, Background, Assessment and   Recommendations(SBAR). Information from the following report(s) SBAR and MAR was reviewed with the receiving nurse. Opportunity for questions and clarification was provided.       Patient transported with:   O2 @ 2 liters  Registered Nurse  [facility]

## 2019-10-12 NOTE — PROGRESS NOTES
2130: Pt received onto the floor from PACU. Pt drowsy but oriented x4. Pt complaining of pain and asking for pain medication. Awaiting orders for pain medicine. 2230: Contacted Dr Poppy Esparza about pt's orders, none put in for med surg floor only for OR PACU. Ordered morphine 2mg now, will review orders. 2240: Updated orders. Pt refused heparin. 0340: While cleaning pt with CHG wipes they stated that their left arm from the elbow down was numb and had been numb since they woke up from surgery. Pt had significantly weaker  than the right hand. Pt could not feel this RN touching their hand. Dr Poppy Esparza was called and came to see the patient. After assessing the pt, Dr Poppy Esparza ordered a stat CT to rule out a stroke. Pt was immediately brought down to CT. Contacted the neurologist and spoke to them about the pt, they said to wrap the pt's elbow to offload pressure from the pt's nerve. 0515: Pt brought back up to the floor. This RN explained what the doctors had told the pt because of a language barrier, the pt wasn't able to understand everything the doctors were saying. Pt given the opportunity to ask questions. They stated they had none at the time. Pt resting in bed.     Patient Vitals for the past 12 hrs:   Temp Pulse Resp BP SpO2   10/12/19 0339 99.3 °F (37.4 °C) 91 15 123/51 99 %   10/12/19 0030 98.4 °F (36.9 °C) 88 16 113/65 98 %   10/12/19 0020 98.4 °F (36.9 °C) (!) 103 16 113/65 100 %   10/11/19 2235 98.7 °F (37.1 °C) 96 17 115/58 99 %   10/11/19 2133     98 %   10/11/19 2114 98.9 °F (37.2 °C) 86 16 125/63 98 %   10/11/19 2100  83 14 114/65 99 %   10/11/19 2055  93 13 122/67 99 %   10/11/19 2050    123/67    10/11/19 2045  90 12 132/72 100 %   10/11/19 2040  91 13 128/73 99 %   10/11/19 2035  86 13 124/73 100 %   10/11/19 2030  95 14 128/69 100 %   10/11/19 2025  94 13 125/67 100 %   10/11/19 2021 98.9 °F (37.2 °C) 91 14 132/75 100 %   10/11/19 2020  89 14 129/71 100 % Bedside and Verbal shift change report given to Humberto Manuel RN (oncoming nurse) by Mary Bermudez RN (offgoing nurse). Report included the following information OR Summary, Procedure Summary, MAR and Recent Results.

## 2019-10-13 LAB
ALBUMIN SERPL-MCNC: 3.2 G/DL (ref 3.4–5)
ALBUMIN/GLOB SERPL: 1 {RATIO} (ref 0.8–1.7)
ALP SERPL-CCNC: 47 U/L (ref 45–117)
ALT SERPL-CCNC: 29 U/L (ref 16–61)
ANION GAP SERPL CALC-SCNC: 4 MMOL/L (ref 3–18)
AST SERPL-CCNC: 35 U/L (ref 10–38)
BASOPHILS # BLD: 0 K/UL (ref 0–0.1)
BASOPHILS NFR BLD: 0 % (ref 0–2)
BILIRUB SERPL-MCNC: 0.8 MG/DL (ref 0.2–1)
BUN SERPL-MCNC: 6 MG/DL (ref 7–18)
BUN/CREAT SERPL: 7 (ref 12–20)
CALCIUM SERPL-MCNC: 8.1 MG/DL (ref 8.5–10.1)
CHLORIDE SERPL-SCNC: 104 MMOL/L (ref 100–111)
CO2 SERPL-SCNC: 31 MMOL/L (ref 21–32)
CREAT SERPL-MCNC: 0.84 MG/DL (ref 0.6–1.3)
DIFFERENTIAL METHOD BLD: ABNORMAL
EOSINOPHIL # BLD: 0.1 K/UL (ref 0–0.4)
EOSINOPHIL NFR BLD: 1 % (ref 0–5)
ERYTHROCYTE [DISTWIDTH] IN BLOOD BY AUTOMATED COUNT: 13.6 % (ref 11.6–14.5)
GLOBULIN SER CALC-MCNC: 3.1 G/DL (ref 2–4)
GLUCOSE SERPL-MCNC: 102 MG/DL (ref 74–99)
HCT VFR BLD AUTO: 41.2 % (ref 36–48)
HGB BLD-MCNC: 13.4 G/DL (ref 13–16)
LYMPHOCYTES # BLD: 1 K/UL (ref 0.9–3.6)
LYMPHOCYTES NFR BLD: 10 % (ref 21–52)
MCH RBC QN AUTO: 28.5 PG (ref 24–34)
MCHC RBC AUTO-ENTMCNC: 32.5 G/DL (ref 31–37)
MCV RBC AUTO: 87.5 FL (ref 74–97)
MONOCYTES # BLD: 1.2 K/UL (ref 0.05–1.2)
MONOCYTES NFR BLD: 12 % (ref 3–10)
NEUTS SEG # BLD: 7.8 K/UL (ref 1.8–8)
NEUTS SEG NFR BLD: 77 % (ref 40–73)
PLATELET # BLD AUTO: 195 K/UL (ref 135–420)
PMV BLD AUTO: 9.5 FL (ref 9.2–11.8)
POTASSIUM SERPL-SCNC: 4 MMOL/L (ref 3.5–5.5)
PROT SERPL-MCNC: 6.3 G/DL (ref 6.4–8.2)
RBC # BLD AUTO: 4.71 M/UL (ref 4.7–5.5)
SODIUM SERPL-SCNC: 139 MMOL/L (ref 136–145)
WBC # BLD AUTO: 10.2 K/UL (ref 4.6–13.2)

## 2019-10-13 PROCEDURE — 80053 COMPREHEN METABOLIC PANEL: CPT

## 2019-10-13 PROCEDURE — 36415 COLL VENOUS BLD VENIPUNCTURE: CPT

## 2019-10-13 PROCEDURE — 85025 COMPLETE CBC W/AUTO DIFF WBC: CPT

## 2019-10-13 PROCEDURE — 74011250636 HC RX REV CODE- 250/636: Performed by: SURGERY

## 2019-10-13 PROCEDURE — 65270000029 HC RM PRIVATE

## 2019-10-13 PROCEDURE — 74011250636 HC RX REV CODE- 250/636: Performed by: FAMILY MEDICINE

## 2019-10-13 PROCEDURE — 77030027138 HC INCENT SPIROMETER -A

## 2019-10-13 PROCEDURE — 74011000258 HC RX REV CODE- 258: Performed by: FAMILY MEDICINE

## 2019-10-13 RX ORDER — MAGNESIUM SULFATE HEPTAHYDRATE 40 MG/ML
2 INJECTION, SOLUTION INTRAVENOUS ONCE
Status: COMPLETED | OUTPATIENT
Start: 2019-10-13 | End: 2019-10-13

## 2019-10-13 RX ORDER — MORPHINE SULFATE 2 MG/ML
2 INJECTION, SOLUTION INTRAMUSCULAR; INTRAVENOUS
Status: DISCONTINUED | OUTPATIENT
Start: 2019-10-13 | End: 2019-10-16

## 2019-10-13 RX ORDER — MAGNESIUM SULFATE 1 G/100ML
1 INJECTION INTRAVENOUS ONCE
Status: COMPLETED | OUTPATIENT
Start: 2019-10-13 | End: 2019-10-13

## 2019-10-13 RX ORDER — HYDROMORPHONE HYDROCHLORIDE 1 MG/ML
1 INJECTION, SOLUTION INTRAMUSCULAR; INTRAVENOUS; SUBCUTANEOUS
Status: DISCONTINUED | OUTPATIENT
Start: 2019-10-13 | End: 2019-10-16

## 2019-10-13 RX ADMIN — DEXTROSE MONOHYDRATE AND SODIUM CHLORIDE 100 ML/HR: 5; .9 INJECTION, SOLUTION INTRAVENOUS at 19:57

## 2019-10-13 RX ADMIN — Medication 10 ML: at 13:23

## 2019-10-13 RX ADMIN — DEXTROSE MONOHYDRATE AND SODIUM CHLORIDE 100 ML/HR: 5; .9 INJECTION, SOLUTION INTRAVENOUS at 05:25

## 2019-10-13 RX ADMIN — MAGNESIUM SULFATE HEPTAHYDRATE 1 G: 1 INJECTION, SOLUTION INTRAVENOUS at 14:45

## 2019-10-13 RX ADMIN — Medication 10 ML: at 19:13

## 2019-10-13 RX ADMIN — HYDROMORPHONE HYDROCHLORIDE 1 MG: 1 INJECTION, SOLUTION INTRAMUSCULAR; INTRAVENOUS; SUBCUTANEOUS at 11:26

## 2019-10-13 RX ADMIN — MORPHINE SULFATE 2 MG: 2 INJECTION, SOLUTION INTRAMUSCULAR; INTRAVENOUS at 19:13

## 2019-10-13 RX ADMIN — MORPHINE SULFATE 2 MG: 2 INJECTION, SOLUTION INTRAMUSCULAR; INTRAVENOUS at 05:25

## 2019-10-13 RX ADMIN — Medication 10 ML: at 05:32

## 2019-10-13 RX ADMIN — MAGNESIUM SULFATE HEPTAHYDRATE 2 G: 40 INJECTION, SOLUTION INTRAVENOUS at 13:21

## 2019-10-13 RX ADMIN — HYDROMORPHONE HYDROCHLORIDE 1 MG: 1 INJECTION, SOLUTION INTRAMUSCULAR; INTRAVENOUS; SUBCUTANEOUS at 22:24

## 2019-10-13 NOTE — PROGRESS NOTES
Pt resting in bed, IS provided with return demonstration tolerated up to 1400 ml. Ice pack provided for abdominal comfort and pillow for splinting with movement. SCD's on    0515 Pt can have Morphine or Dilaudid for low moderate to severe pain per Dr. Fredy Mercedes    0533 Morphine given after pt ambulated in hallway. CHG wipes given after pt back in room. Using urinal to void. Pt reported numbness of L thumb, index and middle finger - MD already aware    0630 Sleeping, easily wakened, no distress noted.  JULIO with  serosanguinous drainage

## 2019-10-13 NOTE — PROGRESS NOTES
Hospitalist Progress Note    Patient: Susanne Kamara MRN: [id number]  CSN: [id number]    YOB: 1995  Age: 30 y.o. Sex: male    DOA: 10/8/2019 LOS:  LOS: 4 days                Assessment and Plan:    Principal Problem:    Volvulus of sigmoid colon ([address] (10/9/2019)    Active Problems:    Constipation (10/9/2019)      Abdominal pain (10/9/2019)        POD 2 from resection of colonic volvulus: plans per general surgery. WBC is coming down    Paresthesias: may be positional. CT head and neuro work up was negative. Will check B122 with labs tomorrow      Chief complaint:  Abdominal pain and constipation       Subjective:    Feels sore but able to get up a little      Review of systems:    General: No fevers or chills. Cardiovascular: No chest pain or pressure. No palpitations. Pulmonary: No shortness of breath. Gastrointestinal: No nausea, vomiting. Objective:    Vital signs/Intake and Output:  Visit Vitals  /77 (BP 1 Location: Right arm, BP Patient Position: At rest)   Pulse 88   Temp 97.9 °F (36.6 °C)   Resp 16   Ht 5' 10\" (1.778 m)   Wt 77.7 kg (171 lb 6.4 oz)   SpO2 100%   BMI 24.59 kg/m²     Current Shift:  No intake/output data recorded. Last three shifts:  10/11 1901 - 10/13 0700  In: 2880 [I.V.:2880]  Out: 4840 [Urine:4585; Drains:205]    Physical Exam:  General: NAD, AAOx3. Non-toxic. HEENT: NC/AT. PERRLA, EOMI.  MMM. Lungs: Nml inspection. CTA B/L. No wheezing, rales or rhonchi. Heart:  S1S2 RRR,  PMI mid 5th IC space. No M/RG. Abdomen: Soft, NT/ND.  BS+. No peritoneal signs. Extremities: No C/C/E. Psych:   Nml affect. Neurologic:  2-12 intact. Strength 5/5 throughout.   Sensation symmetrical.          Labs: Results:       Chemistry Recent Labs     10/13/19  0340 10/12/19  1138 10/12/19  0318   * 120* 150*    139 137   K 4.0 4.1 4.5    106 105   CO2 31 29 26   BUN 6* 9 10   CREA 0.84 0.92 0.91   CA 8.1* 8.0* 7.7*   AGAP 4 4 6   BUCR 7* 10* 11* AP 47 48 44*   TP 6.3* 6.6 6.1*   ALB 3.2* 3.4 3.2*   GLOB 3.1 3.2 2.9   AGRAT 1.0 1.1 1.1      CBC w/Diff Recent Labs     10/13/19  0340 10/12/19  0318   WBC 10.2 17.4*   RBC 4.71 4.65*   HGB 13.4 13.3   HCT 41.2 40.2    201   GRANS 77*  --    LYMPH 10*  --    EOS 1  --       Cardiac Enzymes No results for input(s): CPK, CKND1, VERONICA in the last 72 hours. No lab exists for component: CKRMB, TROIP   Coagulation No results for input(s): PTP, INR, APTT, INREXT in the last 72 hours. Lipid Panel No results found for: CHOL, CHOLPOCT, CHOLX, CHLST, CHOLV, 687642, HDL, HDLP, LDL, LDLC, DLDLP, 603269, VLDLC, VLDL, TGLX, TRIGL, TRIGP, TGLPOCT, CHHD, CHHDX   BNP No results for input(s): BNPP in the last 72 hours.    Liver Enzymes Recent Labs     10/13/19  0340   TP 6.3*   ALB 3.2*   AP 47   SGOT 35      Thyroid Studies No results found for: T4, T3U, TSH, TSHEXT     Procedures/imaging: see electronic medical records for all procedures/Xrays and details which were not copied into this note but were reviewed prior to creation of Plan

## 2019-10-13 NOTE — PROGRESS NOTES
Progress Note    Patient: Colon Lim MRN: [id number]  CSN: [id number]    YOB: 1995  Age: 30 y.o. Sex: male    DOA: 10/8/2019 LOS:  LOS: 4 days                    Subjective:     Patient states doing well. Pain better controlled. No bowel function. Objective:      Visit Vitals  /72 (BP 1 Location: Right arm, BP Patient Position: At rest)   Pulse 67   Temp 98.1 °F (36.7 °C)   Resp 17   Ht 5' 10\" (1.778 m)   Wt 77.7 kg (171 lb 6.4 oz)   SpO2 99%   BMI 24.59 kg/m²       Physical Exam:  General appearance: Alert, no distress  Lungs: clear to auscultation bilaterally  Heart: regular rate and rhythm,   Abdomen: soft, appropriate tenderness, non distended, drain SS, wound CDI  Extremities: extremities normal, atraumatic, no cyanosis or edema, calfs non-tender  Skin: Skin color, texture, turgor normal. No rashes or lesions  Psych: Alert, oriented x3, appropriate    Intake and Output:  Current Shift:  10/13 0701 - 10/13 1900  In: -   Out: 550 [Urine:550]  Last three shifts:  10/11 1901 - 10/13 0700  In: 2880 [I.V.:2880]  Out: 4840 [Urine:4585; Drains:205]    Lab/Data Reviewed:  CMP:   Lab Results   Component Value Date/Time     10/13/2019 03:40 AM    K 4.0 10/13/2019 03:40 AM     10/13/2019 03:40 AM    CO2 31 10/13/2019 03:40 AM    AGAP 4 10/13/2019 03:40 AM     (H) 10/13/2019 03:40 AM    BUN 6 (L) 10/13/2019 03:40 AM    CREA 0.84 10/13/2019 03:40 AM    GFRAA >60 10/13/2019 03:40 AM    GFRNA >60 10/13/2019 03:40 AM    CA 8.1 (L) 10/13/2019 03:40 AM    ALB 3.2 (L) 10/13/2019 03:40 AM    TP 6.3 (L) 10/13/2019 03:40 AM    GLOB 3.1 10/13/2019 03:40 AM    AGRAT 1.0 10/13/2019 03:40 AM    SGOT 35 10/13/2019 03:40 AM    ALT 29 10/13/2019 03:40 AM     CBC:   Lab Results   Component Value Date/Time    WBC 10.2 10/13/2019 03:40 AM    HGB 13.4 10/13/2019 03:40 AM    HCT 41.2 10/13/2019 03:40 AM     10/13/2019 03:40 AM       Medications Reviewed.     Assessment/Plan Principal Problem:    Volvulus of sigmoid colon ([facility] [address] (10/9/2019)    Active Problems:    Constipation (10/9/2019)      Abdominal pain (10/9/2019)        Await bowel function. Ambulate around nurses station 3x day. DVT proph, IS.     Grayson Brown DO

## 2019-10-13 NOTE — PROGRESS NOTES
Received bedside report from night shift RN. Patient sleeping. Patient alert and oriented x 4. Midline abd incision clean/dry/intact with topical skin glue open to air. JULIO drain patient/charge/draining. Patient stated the numbness in his Left 3 fingers were \"getting better\"    Dr Alek Diaz in to assess patient. 1126 Dilaudid given for pain. Reassessed and patient stated relief. 1250 patient ambulating hallway with family, tolerated well. Morphine given at shift change for abdominal pain. No nausea today, pain meds only given twice. Patient tolerating liquid diet well. Patient, at first needed encouragement, now independently uses IS. Patient Vitals for the past 24 hrs:   Temp Pulse Resp BP SpO2   10/13/19 1803  75 17 147/83 99 %   10/13/19 1541 97.9 °F (36.6 °C) 63 16 144/88 100 %   10/13/19 1147 98.1 °F (36.7 °C) 67 17 155/72 99 %   10/13/19 0820 97.9 °F (36.6 °C) 88 16 153/77 100 %   10/13/19 0335 98.2 °F (36.8 °C) 74 16 127/89 100 %   10/12/19 2325 98 °F (36.7 °C) 66 16 139/69 99 %     Bedside and Verbal shift change report given to Michael Mueller RN (oncoming nurse) by Jerilyn Ahumada, RN (offgoing nurse). Report included the following information SBAR, Kardex, Intake/Output, MAR and Recent Results.

## 2019-10-13 NOTE — ROUTINE PROCESS
Bedside and Verbal shift change report given to Lizz Moss (oncoming nurse) by Soni Hernandez (offgoing nurse). Report included the following information SBAR, Kardex and MAR.

## 2019-10-13 NOTE — OP NOTES
OPERATIVE NOTE    Patient: Lattie Class MRN: [id number]  SSN: [id number]    YOB: 1995  Age: 30 y.o. Sex: male      Indications: This is a 30y.o. year-old male who presents with lifelong chronic abdominal pain and constipation. Recent acute severe pain and obstipation. he  was positive for iris-dilation and colonic volvulus on Xray and suspicion for malrotation on CT scan. The patient was admitted for surgery as at very high risk for life threatening ischemic midgut and ischemic colon. Date of Procedure: 10/11/2019     Preoperative Diagnosis: Colonic volvulus ([address] [K56.2]    Postoperative Diagnosis: sigmoid volvulus, intestinal malrotation      Procedure: Procedure(s):  OPEN COLON RESECTION - RESECTION OF COLONIC VOLVULUS, LADDS PROCEDURE, APPENDECTOMY    Surgeon(s): Surgeon(s) and Role:     González Webb DO - Primary    Assistant(s): Circ-1: Ottoniel Forbes RN  Circ-Relief: Jose Antonio Hammer RN; Lillian Colin RN  Scrub Tech-1: Naima Nash  Scrub Tech-Relief: Spencer BENITEZ  Scrub RN-1: Hudson Zavaleta RN  Surg Asst-1: Lenora Birmingham  Surg Asst-Relief: Kiya Vaca    Anesthesia: General     Procedure: After obtaining informed consent and properly identifying the patient and laterality the patient was brought to the operating room and placed in the operating table in the supine position. General endotracheal anesthesia was administered. The patient was then placed in the low lithotomy position and all pressure points were padded. The abdomen and perineum was prepped and draped in the standard surgical fashion. A low midline incision was made and an silvia wound protector was placed. Under direct visualization an exparel TAP block was performed. The giant sigmoid volvulus burst through the wound right away. The rest of the intestinal contents were eviscerated. The entire abdomen was inspected.   Findings include malrotation, Jackson's bands on right at hepatic flexure, LOT not crossing midline, non-fixation of cecum, non-fixation of left and sigmoid, Jackson's bands of mid descending colon to midline - very thick and wide. Volvulus of left colon between splenic flexure and bands and second severe volvulus of sigmoid and rectum with iris dilation 15cm. The sigmoid was grasped and lifted. The volvulus had already been decompressed by colonoscopic method and was maintained with a rectal tube however could only be passed to the tight mid left colon bands and therefore did not treat the proximal volvulus. It was resected tristen to the mid rectum which was dilated to approximately 15cm. The midline Dennison's band attachments were painstakingly taken down with a combination of cautery and sharp dissection. This was done with a great degree of difficulty as to not injure the adjacent organs and primary blood supply and added an extra hour to the case. Thereafter, resection of the proximal left colon volvulus was performed. . The mesentery was taken with an enseal energy device and special attention was taken to identify and avoid the left ureter. The splenic flexure was not fixated. The proximal transection point was then identified in the transverse colon area. The impact was used to transect the adjacent mesentery. The specimen was then handed off the table. Extrinsic obstruction to the duodenum, peritoneal bands found crossing the duodenum found, were ligated with careful attention to protecting the superior mesenteric vessels. The hepatic flexure was taken down and a kocher maneuver was utilized. Jackson procedure continued, including widening of the mesenteric base and dissection of peritoneal bands. The cecum with its mesentery was placed in the left upper quadrant and exposed the anterior duodenum through its entire length. An appendectomy was performed using a blue load stapler and enseal device for the vascular supply without difficutly.   After completion of the Jackson's procedure,  a 2-0 Prolene was utilized to create a pursestring in the proximal colon and an anvil was placed. Under direct visualization the 33 EEA stapler was placed through the anus into the rectal stump. Via palpation and visualization the spike was passed through the rectal wall just anterior to the staple line. An EEA stapled anastomosis was then performed without difficulty. Both donuts were intact. They were sent to pathology for examination. A leak test was performed with insufflation of the rectum under saline. There was no evidence of leak of the anastomosis  The area was irrigated and irrigant removed via suction and hemostasis was assured. The length of the left colon was once again identified to ensure no torsion of the colon or small bowel trapped posteriorly behind the colon. All the colon was placed on the left with the cecum in the LUQ and the small bowel with widened mesentery was placed on the right. A drain was placed in the pelvis exiting through the RLQ. The fascia was closed a running #1 PDS suture. Gloves and instruments were changed and the wounds irrigated. The skin was closed using a 3-0 stratafix stitch and then dressed with prineo. The patient was awakened from anesthesia and extubated without complication and transported to the PACU for further observation. Sponge/ Needle/ Instrument count reported as correct. Findings: Malrotation, Jackson's bands on right at hepatic flexure, LOT not crossing midline, non-fixation of cecum, non-fixation of left and sigmoid, jackson's bands of mid descending colon to midline - very thick and wide and constricting. Volvulus of left colon between splenic flexure and bands and second severe volvulus of sigmoid and rectum with iris dilation 15cm.     Estimated Blood Loss: Minimal    Specimens:   ID Type Source Tests Collected by Time Destination   1 : APPENDIX Preservative Appendix  Nan Duenas, DO 10/11/2019 1853 Pathology   2 : PROXIMAL AND DISTAL DONUTS Preservative Other                  Roula Underwood, DO 10/11/2019 1859 Pathology   3 : SIGMOID AND UPPER RECTUM Preservative Sigmoid  Roula Underwood, DO 10/11/2019 1938 Pathology   4 : LEFT COLON Preservative Colon, Left  Roula Underwood, DO 10/11/2019 1939 Pathology        Drains: (15f)Basilio-Chappell drain(s) with closed bulb suction in the pelvis    Implants: * No implants in log *    Complications: None; patient tolerated the procedure well.     Laura Black,   10/12/2019  9:33 PM

## 2019-10-13 NOTE — ROUTINE PROCESS
Verbal shift change report given to DAKOTA Castro (oncoming nurse) by Elaine Quinonez RN 
 (offgoing nurse). Report included the following information SBAR, Kardex, Intake/Output, MAR and Recent Results.

## 2019-10-13 NOTE — PROGRESS NOTES
Progress Note    Patient: Eduard Walden MRN: [id number]  CSN: [id number]    YOB: 1995  Age: 30 y.o. Sex: male    DOA: 10/8/2019 LOS:  LOS: 3 days                    Subjective:     Patient states he is in pain. Objective:      Visit Vitals  /62 (BP 1 Location: Right arm, BP Patient Position: At rest)   Pulse 71   Temp 98.4 °F (36.9 °C)   Resp 16   Ht 5' 10\" (1.778 m)   Wt 77.7 kg (171 lb 6.4 oz)   SpO2 95%   BMI 24.59 kg/m²       Physical Exam:  General appearance: Alert, no distress  Lungs: clear to auscultation bilaterally  Heart: regular rate and rhythm, S1, S2 normal, no murmur, click, rub or gallop  Abdomen: soft, appropriate tenderness, non distended  Extremities: extremities normal, atraumatic, no cyanosis or edema, calfs non-tender  Skin: Skin color, texture, turgor normal. No rashes or lesions  Psych: Alert, oriented x3, appropriate    Intake and Output:  Current Shift:  No intake/output data recorded. Last three shifts:  10/11 0701 - 10/12 1900  In: 6275 [I.V.:5013]  Out: 4130 [Urine:3835; Drains:145]    Lab/Data Reviewed:  BMP:   Lab Results   Component Value Date/Time     10/12/2019 11:38 AM    K 4.1 10/12/2019 11:38 AM     10/12/2019 11:38 AM    CO2 29 10/12/2019 11:38 AM    AGAP 4 10/12/2019 11:38 AM     (H) 10/12/2019 11:38 AM    BUN 9 10/12/2019 11:38 AM    CREA 0.92 10/12/2019 11:38 AM    GFRAA >60 10/12/2019 11:38 AM    GFRNA >60 10/12/2019 11:38 AM     CBC:   Lab Results   Component Value Date/Time    WBC 17.4 (H) 10/12/2019 03:18 AM    HGB 13.3 10/12/2019 03:18 AM    HCT 40.2 10/12/2019 03:18 AM     10/12/2019 03:18 AM       Medications Reviewed. Assessment/Plan     Principal Problem:    Volvulus of sigmoid colon ([facility] [address] (10/9/2019)    Active Problems:    Constipation (10/9/2019)      Abdominal pain (10/9/2019)      Elevated WBC from intra-operative steroids. Pt stable. Continue current management. Slow clears. Pain control.     Leelee Gil Vicky Hernández,   10/12/2019

## 2019-10-14 LAB
ANION GAP SERPL CALC-SCNC: 3 MMOL/L (ref 3–18)
BASOPHILS # BLD: 0 K/UL (ref 0–0.1)
BASOPHILS NFR BLD: 0 % (ref 0–2)
BUN SERPL-MCNC: 7 MG/DL (ref 7–18)
BUN/CREAT SERPL: 9 (ref 12–20)
CALCIUM SERPL-MCNC: 8.4 MG/DL (ref 8.5–10.1)
CHLORIDE SERPL-SCNC: 105 MMOL/L (ref 100–111)
CO2 SERPL-SCNC: 32 MMOL/L (ref 21–32)
CREAT SERPL-MCNC: 0.81 MG/DL (ref 0.6–1.3)
DIFFERENTIAL METHOD BLD: ABNORMAL
EOSINOPHIL # BLD: 0.1 K/UL (ref 0–0.4)
EOSINOPHIL NFR BLD: 2 % (ref 0–5)
ERYTHROCYTE [DISTWIDTH] IN BLOOD BY AUTOMATED COUNT: 13.8 % (ref 11.6–14.5)
GLUCOSE SERPL-MCNC: 106 MG/DL (ref 74–99)
HCT VFR BLD AUTO: 41.7 % (ref 36–48)
HGB BLD-MCNC: 13.3 G/DL (ref 13–16)
LYMPHOCYTES # BLD: 0.9 K/UL (ref 0.9–3.6)
LYMPHOCYTES NFR BLD: 14 % (ref 21–52)
MAGNESIUM SERPL-MCNC: 2.1 MG/DL (ref 1.6–2.6)
MCH RBC QN AUTO: 28.1 PG (ref 24–34)
MCHC RBC AUTO-ENTMCNC: 31.9 G/DL (ref 31–37)
MCV RBC AUTO: 88.2 FL (ref 74–97)
MONOCYTES # BLD: 0.8 K/UL (ref 0.05–1.2)
MONOCYTES NFR BLD: 13 % (ref 3–10)
NEUTS SEG # BLD: 4.5 K/UL (ref 1.8–8)
NEUTS SEG NFR BLD: 71 % (ref 40–73)
PHOSPHATE SERPL-MCNC: 3.1 MG/DL (ref 2.5–4.9)
PLATELET # BLD AUTO: 187 K/UL (ref 135–420)
PMV BLD AUTO: 9.5 FL (ref 9.2–11.8)
POTASSIUM SERPL-SCNC: 5 MMOL/L (ref 3.5–5.5)
RBC # BLD AUTO: 4.73 M/UL (ref 4.7–5.5)
SODIUM SERPL-SCNC: 140 MMOL/L (ref 136–145)
VIT B12 SERPL-MCNC: 495 PG/ML (ref 211–911)
WBC # BLD AUTO: 6.3 K/UL (ref 4.6–13.2)

## 2019-10-14 PROCEDURE — 83735 ASSAY OF MAGNESIUM: CPT

## 2019-10-14 PROCEDURE — 65270000029 HC RM PRIVATE

## 2019-10-14 PROCEDURE — 74011000258 HC RX REV CODE- 258: Performed by: FAMILY MEDICINE

## 2019-10-14 PROCEDURE — 80048 BASIC METABOLIC PNL TOTAL CA: CPT

## 2019-10-14 PROCEDURE — 74011250636 HC RX REV CODE- 250/636: Performed by: SURGERY

## 2019-10-14 PROCEDURE — 82607 VITAMIN B-12: CPT

## 2019-10-14 PROCEDURE — 85025 COMPLETE CBC W/AUTO DIFF WBC: CPT

## 2019-10-14 PROCEDURE — 84100 ASSAY OF PHOSPHORUS: CPT

## 2019-10-14 PROCEDURE — 36415 COLL VENOUS BLD VENIPUNCTURE: CPT

## 2019-10-14 PROCEDURE — 74011250636 HC RX REV CODE- 250/636: Performed by: FAMILY MEDICINE

## 2019-10-14 RX ORDER — FUROSEMIDE 10 MG/ML
20 INJECTION INTRAMUSCULAR; INTRAVENOUS ONCE
Status: COMPLETED | OUTPATIENT
Start: 2019-10-14 | End: 2019-10-14

## 2019-10-14 RX ORDER — OXYCODONE AND ACETAMINOPHEN 5; 325 MG/1; MG/1
1-2 TABLET ORAL
Status: DISCONTINUED | OUTPATIENT
Start: 2019-10-14 | End: 2019-10-18 | Stop reason: HOSPADM

## 2019-10-14 RX ADMIN — HYDROMORPHONE HYDROCHLORIDE 1 MG: 1 INJECTION, SOLUTION INTRAMUSCULAR; INTRAVENOUS; SUBCUTANEOUS at 10:27

## 2019-10-14 RX ADMIN — HYDROMORPHONE HYDROCHLORIDE 1 MG: 1 INJECTION, SOLUTION INTRAMUSCULAR; INTRAVENOUS; SUBCUTANEOUS at 21:59

## 2019-10-14 RX ADMIN — FUROSEMIDE 20 MG: 10 INJECTION, SOLUTION INTRAMUSCULAR; INTRAVENOUS at 23:44

## 2019-10-14 RX ADMIN — DEXTROSE MONOHYDRATE AND SODIUM CHLORIDE 100 ML/HR: 5; .9 INJECTION, SOLUTION INTRAVENOUS at 20:29

## 2019-10-14 NOTE — PROGRESS NOTES
Problem: Major Small and Large Bowel Procedure: Day of Surgery (Initiate SCIP Measures for Post-Op Care)  Goal: Nutrition/Diet  Outcome: Progressing Towards Goal  Goal: Medications  Outcome: Progressing Towards Goal     Problem: Pain  Goal: *Control of Pain  Outcome: Progressing Towards Goal     Problem: Patient Education: Go to Patient Education Activity  Goal: Patient/Family Education  Outcome: Progressing Towards Goal     Problem: Falls - Risk of  Goal: *Absence of Falls  Description  Document Abran Tae Fall Risk and appropriate interventions in the flowsheet.   Outcome: Progressing Towards Goal  Note:   Fall Risk Interventions:  Mobility Interventions: Patient to call before getting OOB         Medication Interventions: Patient to call before getting OOB, Teach patient to arise slowly    Elimination Interventions: Call light in reach, Elevated toilet seat, Patient to call for help with toileting needs

## 2019-10-14 NOTE — PROGRESS NOTES
INITIAL NUTRITION ASSESSMENT     RECOMMENDATIONS/PLAN:   -Adv diet per MD  -avoid prolonged clear diet as it is hard to meet estimated needs (day 5)  -Monitor labs/lytes, BM, PO intake, skin integrity, wt, fluid status    REASON FOR ASSESSMENT:     [x] Positive Nutrition Screen:  [] BMI <19  [x] NPO/Clear Liquid Diet > 5 days (>3 days in ICU)  [] New Order for TPN    NUTRITION ASSESSMENT:   Client History: 30 yrs old Male admitted with abdominal pain and constipation, volvulus of sigmoid colon, POP 3 resection of colonic volvulus parasthesias     PMHx: None  Cultural/Anabaptist Food Preferences: None Identified    FOOD/NUTRITION HISTORY  Diet History: pt is ordering water, jello, and ice cream and tolerating well. Eats everything   Food Allergies:  [x] NKFA     [] Yes   Pertinent PTA Medications: none     NUTRITION INTAKE   Diet Order:  Clear     Average PO Intake:       Patient Vitals for the past 100 hrs:   % Diet Eaten   10/13/19 1920 100 %   10/13/19 0855 100 %   10/11/19 1200 0 %   10/11/19 0800 0 %   10/10/19 0913 50 %      Pertinent Medications:  [x] Reviewed; zofran  Electrolyte Replacement Protocol: []K  []Mg  []PO4    Insulin:  [] SSI  [] Pre-meal   []  Basal   [] Drip  [x] None  Pt expected to meet estimated nutrient needs through next review:          []  Yes     [x] No; clear liquid diet ANTHROPOMETRICS  Height: 5' 10\" (177.8 cm)       Weight: 75.3 kg (166 lb 0.1 oz)    BMI: 23.8 kg/m^2  -  normal weight (18.5%-24.9% BMI)        Weight change: no wt loss                                  Comparison to Reference Standards:  IBW: 166 lbs      %IBW: 100%      AdjBW: n/a    NUTRITION-FOCUSED PHYSICAL ASSESSMENT  Skin: No PU    GI: +BM 10/12    BIOCHEMICAL DATA & MEDICAL TESTS  Pertinent Labs:  [x] Reviewed;     NUTRITION PRESCRIPTION  Calories: 4803-0778 kcal/day based on 25-30 kcal/kg  Protein: 60 g/day based on g/kg  Fluid: 8017-1135 ml/day based on 1 kcal/ml      NUTRITION DIAGNOSES:   1.  Inadequate oral intake related to diet as evidenced by clear liquid diet. NUTRITION INTERVENTIONS:   INTERVENTIONS:        GOALS:  1. -Adv diet per MD  -avoid prolonged clear diet as it is hard to meet estimated needs (day 5) 1. Adv diet per MD by next review 3 days     LEARNING NEEDS (Diet, Supplementation, Food/Nutrient-Drug Interaction):   [] None Identified  [] Inpatient education provided/documented    [] Identified and patient:  [] Declined     [x] Was not appropriate/indicated  NUTRITION MONITORING /EVALUATION:   Monitor wt  Monitor renal labs, electrolytes, fluid status  Monitor for additional supplement needs  Reassess for diet reinforcement     [] Participated in Interdisciplinary Rounds  [x] Interdisciplinary Care Plan Reviewed/Documented  DISCHARGE NUTRITION RECOMMENDATIONS ADDRESSED:       [x] To be determined closer to discharge    NUTRITION RISK:     [x]  At risk                     []  Not currently at risk     Will follow-up per policy.   Presley Lantigua 1

## 2019-10-14 NOTE — ROUTINE PROCESS
Bedside and Verbal shift change report given to SHANICE Bashir RN (oncoming nurse) by Elidia Valenzuela RN 
 (offgoing nurse). Report included the following information SBAR, Kardex, Intake/Output, MAR and Recent Results.

## 2019-10-14 NOTE — PROGRESS NOTES
Resting quietly in bed, instructed to call if assistance or pain med needed. Urinal by bedside. JULIO with scant drainage at this time    0645 Pt slept most of shift, using IS independently. Dilaudid IV given once which seemed to help control pain for remainder of shift. Still on clear liquids, tolerating. BS active, incision site well-approximated, JULIO with serosang drainage.  Shift uneventful

## 2019-10-14 NOTE — PROGRESS NOTES
0745  Bedside and Verbal shift change report given by PATRICIA Pearson(off going nurse) to Renetta Tiwari RN (on coming). Report included the following information SBAR, Kardex, OR Summary, Intake/Output and MAR.     1935  Bedside and Verbal shift change report given to FERNANDO Box RN (on coming nurse) by Renetta Tiwari RN (off going). Report included the following information SBAR, Kardex, OR Summary, Intake/Output and MAR.

## 2019-10-14 NOTE — PROGRESS NOTES
Hospitalist Progress Note    Patient: Esther Paulson MRN: [id number]  CSN: [id number]    YOB: 1995  Age: 30 y.o. Sex: male    DOA: 10/8/2019 LOS:  LOS: 5 days                Assessment/Plan     Patient Active Problem List   Diagnosis Code    [facility] colon ([facility] K56.2    Constipation K59.00    Abdominal pain R10.9            29 yo male admitted for abdominal pain. Still with abdominal soreness, no acute events overnight. Sigmoid volvulus - s/p open colon resection, resection of colonic volvuls, ladds procedure, appendectomy. No flatus or BM  On clear liquids  Ambulate  Advancing diet per general surgery. SCD    Disposition : 2-3 days    Review of systems  General: No fevers or chills. Cardiovascular: No chest pain or pressure. No palpitations. Pulmonary: No shortness of breath. Gastrointestinal: No nausea, vomiting. Physical Exam:  General: Awake, cooperative, no acute distress    HEENT: NC, Atraumatic. PERRLA, anicteric sclerae. Lungs: CTA Bilaterally. No Wheezing/Rhonchi/Rales. Heart:  S1 S2,  No murmur, No Rubs, No Gallops  Abdomen: Soft, Non distended, gen tender. Post op abdomen.    Extremities: No c/c/e  Psych:   Not anxious or agitated. Neurologic:  No acute neurological deficit. Vital signs/Intake and Output:  Visit Vitals  /70   Pulse 76   Temp 98.8 °F (37.1 °C)   Resp 16   Ht 5' 10\" (1.778 m)   Wt 75.3 kg (166 lb 0.1 oz)   SpO2 97%   BMI 23.82 kg/m²     Current Shift:  No intake/output data recorded.   Last three shifts:  10/12 1901 - 10/14 0700  In: 1622 [I.V.:3575]  Out: 2750 [Urine:2600; Drains:150]            Labs: Results:       Chemistry Recent Labs     10/14/19  0358 10/13/19  0340 10/12/19  1138 10/12/19  0318   * 102* 120* 150*    139 139 137   K 5.0 4.0 4.1 4.5    104 106 105   CO2 32 31 29 26   BUN 7 6* 9 10   CREA 0.81 0.84 0.92 0.91   CA 8.4* 8.1* 8.0* 7.7*   AGAP 3 4 4 6   BUCR 9* 7* 10* 11* AP  --  47 48 44*   TP  --  6.3* 6.6 6.1*   ALB  --  3.2* 3.4 3.2*   GLOB  --  3.1 3.2 2.9   AGRAT  --  1.0 1.1 1.1      CBC w/Diff Recent Labs     10/14/19  0358 10/13/19  0340 10/12/19  0318   WBC 6.3 10.2 17.4*   RBC 4.73 4.71 4.65*   HGB 13.3 13.4 13.3   HCT 41.7 41.2 40.2    195 201   GRANS 71 77*  --    LYMPH 14* 10*  --    EOS 2 1  --       Cardiac Enzymes No results for input(s): CPK, CKND1, VERONICA in the last 72 hours. No lab exists for component: CKRMB, TROIP   Coagulation No results for input(s): PTP, INR, APTT, INREXT in the last 72 hours. Lipid Panel No results found for: CHOL, CHOLPOCT, CHOLX, CHLST, CHOLV, 585112, HDL, HDLP, LDL, LDLC, DLDLP, 661360, VLDLC, VLDL, TGLX, TRIGL, TRIGP, TGLPOCT, CHHD, CHHDX   BNP No results for input(s): BNPP in the last 72 hours.    Liver Enzymes Recent Labs     10/13/19  0340   TP 6.3*   ALB 3.2*   AP 47   SGOT 35      Thyroid Studies No results found for: T4, T3U, TSH, TSHEXT     Procedures/imaging: see electronic medical records for all procedures/Xrays and details which were not copied into this note but were reviewed prior to creation of Plan

## 2019-10-14 NOTE — PROGRESS NOTES
Please continue to encourage ambulation as appropriate to assist with identifying potential transition of care. No needs have been identifed at this time. CM remains available. Care Management Interventions  Mode of Transport at Discharge:  Other (see comment)()  Transition of Care Consult (CM Consult): Discharge Planning  Health Maintenance Reviewed: Yes  Confirm Follow Up Transport: Self  Plan discussed with Pt/Family/Caregiver: Yes  Discharge Location  Discharge Placement: Home with family assistance

## 2019-10-15 LAB
ANION GAP SERPL CALC-SCNC: 6 MMOL/L (ref 3–18)
BASOPHILS # BLD: 0 K/UL (ref 0–0.1)
BASOPHILS NFR BLD: 0 % (ref 0–2)
BUN SERPL-MCNC: 9 MG/DL (ref 7–18)
BUN/CREAT SERPL: 11 (ref 12–20)
CALCIUM SERPL-MCNC: 8.6 MG/DL (ref 8.5–10.1)
CHLORIDE SERPL-SCNC: 100 MMOL/L (ref 100–111)
CO2 SERPL-SCNC: 30 MMOL/L (ref 21–32)
CREAT SERPL-MCNC: 0.85 MG/DL (ref 0.6–1.3)
DIFFERENTIAL METHOD BLD: ABNORMAL
EOSINOPHIL # BLD: 0.2 K/UL (ref 0–0.4)
EOSINOPHIL NFR BLD: 2 % (ref 0–5)
ERYTHROCYTE [DISTWIDTH] IN BLOOD BY AUTOMATED COUNT: 13.3 % (ref 11.6–14.5)
GLUCOSE SERPL-MCNC: 102 MG/DL (ref 74–99)
HCT VFR BLD AUTO: 43.2 % (ref 36–48)
HGB BLD-MCNC: 14.3 G/DL (ref 13–16)
LYMPHOCYTES # BLD: 1 K/UL (ref 0.9–3.6)
LYMPHOCYTES NFR BLD: 13 % (ref 21–52)
MAGNESIUM SERPL-MCNC: 1.8 MG/DL (ref 1.6–2.6)
MCH RBC QN AUTO: 28.6 PG (ref 24–34)
MCHC RBC AUTO-ENTMCNC: 33.1 G/DL (ref 31–37)
MCV RBC AUTO: 86.4 FL (ref 74–97)
MONOCYTES # BLD: 0.9 K/UL (ref 0.05–1.2)
MONOCYTES NFR BLD: 11 % (ref 3–10)
NEUTS SEG # BLD: 6 K/UL (ref 1.8–8)
NEUTS SEG NFR BLD: 74 % (ref 40–73)
PHOSPHATE SERPL-MCNC: 4.8 MG/DL (ref 2.5–4.9)
PLATELET # BLD AUTO: 237 K/UL (ref 135–420)
PMV BLD AUTO: 9.4 FL (ref 9.2–11.8)
POTASSIUM SERPL-SCNC: 3.7 MMOL/L (ref 3.5–5.5)
RBC # BLD AUTO: 5 M/UL (ref 4.7–5.5)
SODIUM SERPL-SCNC: 136 MMOL/L (ref 136–145)
WBC # BLD AUTO: 8.1 K/UL (ref 4.6–13.2)

## 2019-10-15 PROCEDURE — 83735 ASSAY OF MAGNESIUM: CPT

## 2019-10-15 PROCEDURE — 74011250637 HC RX REV CODE- 250/637: Performed by: SURGERY

## 2019-10-15 PROCEDURE — 65270000029 HC RM PRIVATE

## 2019-10-15 PROCEDURE — 36415 COLL VENOUS BLD VENIPUNCTURE: CPT

## 2019-10-15 PROCEDURE — 85025 COMPLETE CBC W/AUTO DIFF WBC: CPT

## 2019-10-15 PROCEDURE — 74011000258 HC RX REV CODE- 258: Performed by: SURGERY

## 2019-10-15 PROCEDURE — 80048 BASIC METABOLIC PNL TOTAL CA: CPT

## 2019-10-15 PROCEDURE — 84100 ASSAY OF PHOSPHORUS: CPT

## 2019-10-15 RX ADMIN — DEXTROSE MONOHYDRATE AND SODIUM CHLORIDE 50 ML/HR: 5; .9 INJECTION, SOLUTION INTRAVENOUS at 17:26

## 2019-10-15 RX ADMIN — OXYCODONE HYDROCHLORIDE AND ACETAMINOPHEN 2 TABLET: 5; 325 TABLET ORAL at 22:03

## 2019-10-15 RX ADMIN — OXYCODONE HYDROCHLORIDE AND ACETAMINOPHEN 2 TABLET: 5; 325 TABLET ORAL at 00:35

## 2019-10-15 NOTE — PROGRESS NOTES
Hospitalist Progress Note    Patient: Ariela Piña MRN: [id number]  CSN: [id number]    YOB: 1995  Age: 30 y.o. Sex: male    DOA: 10/8/2019 LOS:  LOS: 6 days                Assessment/Plan     Patient Active Problem List   Diagnosis Code    Volvulus of sigmoid colon ([facility] [address] K56.2    Constipation K59.00    Abdominal pain R10.9            31 yo male admitted for abdominal pain. Still with abdominal soreness, no acute events overnight. Sigmoid volvulus - s/p open colon resection, resection of colonic volvuls, ladds procedure, appendectomy. No flatus or BM  On clear liquids  Ambulate  Advancing diet per general surgery. SCD    Disposition : 2-3 days    Review of systems  General: No fevers or chills. Cardiovascular: No chest pain or pressure. No palpitations. Pulmonary: No shortness of breath. Gastrointestinal: No nausea, vomiting. Physical Exam:  General: Awake, cooperative, no acute distress    HEENT: NC, Atraumatic. PERRLA, anicteric sclerae. Lungs: CTA Bilaterally. No Wheezing/Rhonchi/Rales. Heart:  S1 S2,  No murmur, No Rubs, No Gallops  Abdomen: Soft, Non distended, gen tender. Post op abdomen.    Extremities: No c/c/e  Psych:   Not anxious or agitated. Neurologic:  No acute neurological deficit. Vital signs/Intake and Output:  Visit Vitals  /78   Pulse 87   Temp 98.3 °F (36.8 °C)   Resp 16   Ht 5' 10\" (1.778 m)   Wt 74.5 kg (164 lb 4.8 oz)   SpO2 98%   BMI 23.57 kg/m²     Current Shift:  10/15 0701 - 10/15 1900  In: -   Out: 350 [Urine:350]  Last three shifts:  10/13 1901 - 10/15 0700  In: 0252 [P.O.:558;  I.V.:1040]  Out: 2550 [Urine:2450; Drains:100]            Labs: Results:       Chemistry Recent Labs     10/15/19  0222 10/14/19  0358 10/13/19  0340   * 106* 102*    140 139   K 3.7 5.0 4.0    105 104   CO2 30 32 31   BUN 9 7 6*   CREA 0.85 0.81 0.84   CA 8.6 8.4* 8.1*   AGAP 6 3 4   BUCR 11* 9* 7*   AP  --   --  47   TP --   --  6.3*   ALB  --   --  3.2*   GLOB  --   --  3.1   AGRAT  --   --  1.0      CBC w/Diff Recent Labs     10/15/19  0222 10/14/19  0358 10/13/19  0340   WBC 8.1 6.3 10.2   RBC 5.00 4.73 4.71   HGB 14.3 13.3 13.4   HCT 43.2 41.7 41.2    187 195   GRANS 74* 71 77*   LYMPH 13* 14* 10*   EOS 2 2 1      Cardiac Enzymes No results for input(s): CPK, CKND1, VERONICA in the last 72 hours. No lab exists for component: CKRMB, TROIP   Coagulation No results for input(s): PTP, INR, APTT, INREXT, INREXT in the last 72 hours. Lipid Panel No results found for: CHOL, CHOLPOCT, CHOLX, CHLST, CHOLV, 527089, HDL, HDLP, LDL, LDLC, DLDLP, 185346, VLDLC, VLDL, TGLX, TRIGL, TRIGP, TGLPOCT, CHHD, CHHDX   BNP No results for input(s): BNPP in the last 72 hours.    Liver Enzymes Recent Labs     10/13/19  0340   TP 6.3*   ALB 3.2*   AP 47   SGOT 35      Thyroid Studies No results found for: T4, T3U, TSH, TSHEXT, TSHEXT     Procedures/imaging: see electronic medical records for all procedures/Xrays and details which were not copied into this note but were reviewed prior to creation of Plan

## 2019-10-15 NOTE — PROGRESS NOTES
0720  Bedside and Verbal shift change report given by PATRICIA Dong (off going nurse) to Audie Reed RN (on coming). Report included the following information SBAR, Kardex, OR Summary, Intake/Output and MAR.     1925  Bedside and Verbal shift change report given to SURINDER Malhotra RN (on coming nurse) by Audie Reed RN (off going). Report included the following information SBAR, Kardex, OR Summary, Intake/Output and MAR.

## 2019-10-15 NOTE — ROUTINE PROCESS
Bedside and Verbal shift change report given to SHANICE Bashir RN by Pal Kelly. Report included the following information SBAR, Kardex, OR Summary, Intake/Output and MAR.

## 2019-10-15 NOTE — PROGRESS NOTES
Problem: Major Small and Large Bowel Procedure: Day of Surgery (Initiate SCIP Measures for Post-Op Care)  Goal: Activity/Safety  Outcome: Progressing Towards Goal  Goal: Medications  Outcome: Progressing Towards Goal     Problem: Pain  Goal: *Control of Pain  Outcome: Progressing Towards Goal     Problem: Falls - Risk of  Goal: *Absence of Falls  Description  Document Lupillo Otoole Fall Risk and appropriate interventions in the flowsheet.   Outcome: Progressing Towards Goal  Note:   Fall Risk Interventions:  Mobility Interventions: Patient to call before getting OOB         Medication Interventions: Patient to call before getting OOB    Elimination Interventions: Call light in reach, Elevated toilet seat, Patient to call for help with toileting needs

## 2019-10-15 NOTE — PROGRESS NOTES
Problem: Major Small and Large Bowel Procedure: Day of Surgery (Initiate SCIP Measures for Post-Op Care)  Goal: Activity/Safety  Outcome: Progressing Towards Goal  Goal: Nutrition/Diet  Outcome: Progressing Towards Goal  Goal: Medications  Outcome: Progressing Towards Goal  Goal: Respiratory  Outcome: Progressing Towards Goal  Goal: Psychosocial  Outcome: Progressing Towards Goal  Goal: *Optimal pain control at patient's stated goal  Outcome: Progressing Towards Goal  Goal: *Adequate urinary output (equal to or greater than 30 milliliters/hour)  Outcome: Progressing Towards Goal     Problem: Pain  Goal: *Control of Pain  Outcome: Progressing Towards Goal     Problem: Patient Education: Go to Patient Education Activity  Goal: Patient/Family Education  Outcome: Progressing Towards Goal     Problem: Falls - Risk of  Goal: *Absence of Falls  Description  Document Kt Noel Fall Risk and appropriate interventions in the flowsheet. Outcome: Progressing Towards Goal  Note:   Fall Risk Interventions:  Mobility Interventions: Communicate number of staff needed for ambulation/transfer, Patient to call before getting OOB         Medication Interventions: Patient to call before getting OOB, Teach patient to arise slowly    Elimination Interventions: Call light in reach, Patient to call for help with toileting needs              Problem: Patient Education: Go to Patient Education Activity  Goal: Patient/Family Education  Outcome: Progressing Towards Goal     Problem: Pressure Injury - Risk of  Goal: *Prevention of pressure injury  Description  Document Zaki Scale and appropriate interventions in the flowsheet.   Outcome: Progressing Towards Goal  Note:   Pressure Injury Interventions:  Sensory Interventions: Keep linens dry and wrinkle-free    Moisture Interventions: Maintain skin hydration (lotion/cream), Minimize layers    Activity Interventions: Pressure redistribution bed/mattress(bed type), Increase time out of bed    Mobility Interventions: Pressure redistribution bed/mattress (bed type), HOB 30 degrees or less    Nutrition Interventions: Document food/fluid/supplement intake    Friction and Shear Interventions: Minimize layers                Problem: Patient Education: Go to Patient Education Activity  Goal: Patient/Family Education  Outcome: Progressing Towards Goal     Problem: Nutrition Deficit  Goal: *Optimize nutritional status  Outcome: Progressing Towards Goal     Problem: Major Small and Large Bowel Procedure: Day of Surgery (Initiate SCIP Measures for Post-Op Care)  Goal: Activity/Safety  10/15/2019 0320 by Shell Santana, RN  Outcome: Progressing Towards Goal  10/15/2019 0319 by Shell Santana, RN  Outcome: Progressing Towards Goal  Goal: Nutrition/Diet  10/15/2019 0320 by Shell Santana, RN  Outcome: Progressing Towards Goal  10/15/2019 0319 by Shell Santana, RN  Outcome: Progressing Towards Goal  Goal: Medications  10/15/2019 0320 by Shlel Santana, RN  Outcome: Progressing Towards Goal  10/15/2019 0319 by Shell Santana, RN  Outcome: Progressing Towards Goal  Goal: Respiratory  10/15/2019 0320 by Shell Santana, RN  Outcome: Progressing Towards Goal  10/15/2019 0319 by Shell Santana, RN  Outcome: Progressing Towards Goal  Goal: Psychosocial  10/15/2019 0320 by Shell Santana, RN  Outcome: Progressing Towards Goal  10/15/2019 0319 by Shell Santana, RN  Outcome: Progressing Towards Goal  Goal: *Optimal pain control at patient's stated goal  10/15/2019 0320 by Shell Santana, RN  Outcome: Progressing Towards Goal  10/15/2019 0319 by Shell Santana, RN  Outcome: Progressing Towards Goal  Goal: *Adequate urinary output (equal to or greater than 30 milliliters/hour)  10/15/2019 0320 by Shell Santana, RN  Outcome: Progressing Towards Goal  10/15/2019 0319 by Shell Santana, RN  Outcome: Progressing Towards Goal Problem: Pain  Goal: *Control of Pain  10/15/2019 0320 by Mary Wilkinson RN  Outcome: Progressing Towards Goal  10/15/2019 0319 by Mary Wilkinson RN  Outcome: Progressing Towards Goal     Problem: Patient Education: Go to Patient Education Activity  Goal: Patient/Family Education  10/15/2019 0320 by Mary Wilkinson, RN  Outcome: Progressing Towards Goal  10/15/2019 0319 by Mary Wilkinson RN  Outcome: Progressing Towards Goal     Problem: Falls - Risk of  Goal: *Absence of Falls  Description  Document India Cazares Fall Risk and appropriate interventions in the flowsheet. 10/15/2019 0320 by Mary Wilkinson RN  Outcome: Progressing Towards Goal  Note:   Fall Risk Interventions:  Mobility Interventions: Communicate number of staff needed for ambulation/transfer, Patient to call before getting OOB         Medication Interventions: Patient to call before getting OOB, Teach patient to arise slowly    Elimination Interventions: Call light in reach, Patient to call for help with toileting needs           10/15/2019 0319 by Mary Wilkinson RN  Outcome: Progressing Towards Goal  Note:   Fall Risk Interventions:  Mobility Interventions: Communicate number of staff needed for ambulation/transfer, Patient to call before getting OOB         Medication Interventions: Patient to call before getting OOB, Teach patient to arise slowly    Elimination Interventions: Call light in reach, Patient to call for help with toileting needs              Problem: Patient Education: Go to Patient Education Activity  Goal: Patient/Family Education  10/15/2019 0320 by Mary Wilkinson RN  Outcome: Progressing Towards Goal  10/15/2019 0319 by Mary Wilkinson RN  Outcome: Progressing Towards Goal     Problem: Pressure Injury - Risk of  Goal: *Prevention of pressure injury  Description  Document Zaki Scale and appropriate interventions in the flowsheet.   10/15/2019 0320 by Mary Wilkinson RN  Outcome: Progressing Towards Goal  Note:   Pressure Injury Interventions:  Sensory Interventions: Keep linens dry and wrinkle-free    Moisture Interventions: Maintain skin hydration (lotion/cream), Minimize layers    Activity Interventions: Pressure redistribution bed/mattress(bed type), Increase time out of bed    Mobility Interventions: Pressure redistribution bed/mattress (bed type), HOB 30 degrees or less    Nutrition Interventions: Document food/fluid/supplement intake    Friction and Shear Interventions: Minimize layers             10/15/2019 0319 by Aniya Doan, RN  Outcome: Progressing Towards Goal  Note:   Pressure Injury Interventions:  Sensory Interventions: Keep linens dry and wrinkle-free    Moisture Interventions: Maintain skin hydration (lotion/cream), Minimize layers    Activity Interventions: Pressure redistribution bed/mattress(bed type), Increase time out of bed    Mobility Interventions: Pressure redistribution bed/mattress (bed type), HOB 30 degrees or less    Nutrition Interventions: Document food/fluid/supplement intake    Friction and Shear Interventions: Minimize layers                Problem: Patient Education: Go to Patient Education Activity  Goal: Patient/Family Education  10/15/2019 0320 by Aniya Doan, RN  Outcome: Progressing Towards Goal  10/15/2019 0319 by Aniya Doan, RN  Outcome: Progressing Towards Goal     Problem: Nutrition Deficit  Goal: *Optimize nutritional status  10/15/2019 0320 by Aniya Doan, RN  Outcome: Progressing Towards Goal  10/15/2019 0319 by Aniya Doan, RN  Outcome: Progressing Towards Goal     Problem: Major Small and Large Bowel Procedure: Day of Surgery (Initiate SCIP Measures for Post-Op Care)  Goal: Activity/Safety  10/15/2019 0320 by Aniya Doan, RN  Outcome: Progressing Towards Goal  10/15/2019 0319 by Aniya Doan, RN  Outcome: Progressing Towards Goal  Goal: Nutrition/Diet  10/15/2019 0320 by Naga Bennett., RN  Outcome: Progressing Towards Goal  10/15/2019 0319 by Naga Maddoxma., RN  Outcome: Progressing Towards Goal  Goal: Medications  10/15/2019 0320 by Naga Maddoxma., RN  Outcome: Progressing Towards Goal  10/15/2019 0319 by Naga Maddoxma., RN  Outcome: Progressing Towards Goal  Goal: Respiratory  10/15/2019 0320 by Naga Maddoxma., RN  Outcome: Progressing Towards Goal  10/15/2019 0319 by Naga Maddoxma., RN  Outcome: Progressing Towards Goal  Goal: Psychosocial  10/15/2019 0320 by Naga Maddoxma., RN  Outcome: Progressing Towards Goal  10/15/2019 0319 by Naga Maddoxma., RN  Outcome: Progressing Towards Goal  Goal: *Optimal pain control at patient's stated goal  10/15/2019 0320 by Naga Bennett., RN  Outcome: Progressing Towards Goal  10/15/2019 0319 by Naga Maddoxma., RN  Outcome: Progressing Towards Goal  Goal: *Adequate urinary output (equal to or greater than 30 milliliters/hour)  10/15/2019 0320 by Naga Maddoxma., RN  Outcome: Progressing Towards Goal  10/15/2019 0319 by Naga Madodxma., RN  Outcome: Progressing Towards Goal     Problem: Pain  Goal: *Control of Pain  10/15/2019 0320 by Naga Bennett., RN  Outcome: Progressing Towards Goal  10/15/2019 0319 by Ngaa Bennett., RN  Outcome: Progressing Towards Goal     Problem: Patient Education: Go to Patient Education Activity  Goal: Patient/Family Education  10/15/2019 0320 by Naga Bennett., RN  Outcome: Progressing Towards Goal  10/15/2019 0319 by Naga Bennett., RN  Outcome: Progressing Towards Goal     Problem: Falls - Risk of  Goal: *Absence of Falls  Description  Document Abarn Tae Fall Risk and appropriate interventions in the flowsheet.   10/15/2019 0320 by Naga Bennett., RN  Outcome: Progressing Towards Goal  Note:   Fall Risk Interventions:  Mobility Interventions: Communicate number of staff needed for ambulation/transfer, Patient to call before getting OOB         Medication Interventions: Patient to call before getting OOB, Teach patient to arise slowly    Elimination Interventions: Call light in reach, Patient to call for help with toileting needs           10/15/2019 0319 by Juan Almendarez RN  Outcome: Progressing Towards Goal  Note:   Fall Risk Interventions:  Mobility Interventions: Communicate number of staff needed for ambulation/transfer, Patient to call before getting OOB         Medication Interventions: Patient to call before getting OOB, Teach patient to arise slowly    Elimination Interventions: Call light in reach, Patient to call for help with toileting needs              Problem: Patient Education: Go to Patient Education Activity  Goal: Patient/Family Education  10/15/2019 0320 by Juan Almendarez RN  Outcome: Progressing Towards Goal  10/15/2019 0319 by Juan Almendarez RN  Outcome: Progressing Towards Goal     Problem: Pressure Injury - Risk of  Goal: *Prevention of pressure injury  Description  Document Zaki Scale and appropriate interventions in the flowsheet.   10/15/2019 0320 by Juan Almendarez RN  Outcome: Progressing Towards Goal  Note:   Pressure Injury Interventions:  Sensory Interventions: Keep linens dry and wrinkle-free    Moisture Interventions: Maintain skin hydration (lotion/cream), Minimize layers    Activity Interventions: Pressure redistribution bed/mattress(bed type), Increase time out of bed    Mobility Interventions: Pressure redistribution bed/mattress (bed type), HOB 30 degrees or less    Nutrition Interventions: Document food/fluid/supplement intake    Friction and Shear Interventions: Minimize layers             10/15/2019 0319 by Juan Almendarez RN  Outcome: Progressing Towards Goal  Note:   Pressure Injury Interventions:  Sensory Interventions: Keep linens dry and wrinkle-free    Moisture Interventions: Maintain skin hydration (lotion/cream), Minimize layers    Activity Interventions: Pressure redistribution bed/mattress(bed type), Increase time out of bed    Mobility Interventions: Pressure redistribution bed/mattress (bed type), HOB 30 degrees or less    Nutrition Interventions: Document food/fluid/supplement intake    Friction and Shear Interventions: Minimize layers                Problem: Patient Education: Go to Patient Education Activity  Goal: Patient/Family Education  10/15/2019 0320 by Becca Castillo RN  Outcome: Progressing Towards Goal  10/15/2019 0319 by Becca Castillo RN  Outcome: Progressing Towards Goal     Problem: Nutrition Deficit  Goal: *Optimize nutritional status  10/15/2019 0320 by Becca Castillo RN  Outcome: Progressing Towards Goal  10/15/2019 0319 by Becca Castillo RN  Outcome: Progressing Towards Goal

## 2019-10-15 NOTE — PROGRESS NOTES
Progress Note    Patient: Chadwick Garay MRN: [id number]  CSN: [id number]    YOB: 1995  Age: 30 y.o. Sex: male    DOA: 10/8/2019 LOS:  LOS: 5 days                    Subjective:     Patient states doing well. Pain better controlled. No bowel function. Objective:      Visit Vitals  /78 (BP 1 Location: Right arm, BP Patient Position: At rest)   Pulse 84   Temp 100.1 °F (37.8 °C)   Resp 18   Ht 5' 10\" (1.778 m)   Wt 75.3 kg (166 lb 0.1 oz)   SpO2 100%   BMI 23.82 kg/m²       Physical Exam:  General appearance: Alert, no distress  Lungs: clear to auscultation bilaterally  Heart: regular rate and rhythm,   Abdomen: soft, appropriate tenderness, non distended, drain SS, wound CDI  Extremities: extremities normal, atraumatic, no cyanosis or edema, calfs non-tender  Skin: Skin color, texture, turgor normal. No rashes or lesions  Psych: Alert, oriented x3, appropriate    Intake and Output:  Current Shift:  No intake/output data recorded. Last three shifts:  10/13 0701 - 10/14 1900  In: 7688 [P.O.:118; I.V.:2395]  Out: 1525 [Urine:1400; Drains:125]    Lab/Data Reviewed:  CMP:   Lab Results   Component Value Date/Time     10/14/2019 03:58 AM    K 5.0 10/14/2019 03:58 AM     10/14/2019 03:58 AM    CO2 32 10/14/2019 03:58 AM    AGAP 3 10/14/2019 03:58 AM     (H) 10/14/2019 03:58 AM    BUN 7 10/14/2019 03:58 AM    CREA 0.81 10/14/2019 03:58 AM    GFRAA >60 10/14/2019 03:58 AM    GFRNA >60 10/14/2019 03:58 AM    CA 8.4 (L) 10/14/2019 03:58 AM    MG 2.1 10/14/2019 03:58 AM    PHOS 3.1 10/14/2019 03:58 AM     CBC:   Lab Results   Component Value Date/Time    WBC 6.3 10/14/2019 03:58 AM    HGB 13.3 10/14/2019 03:58 AM    HCT 41.7 10/14/2019 03:58 AM     10/14/2019 03:58 AM       Medications Reviewed.     Assessment/Plan     Principal Problem:    Volvulus of sigmoid colon ([facility] [address] (10/9/2019)    Active Problems:    Constipation (10/9/2019)      Abdominal pain (10/9/2019)        Await bowel function. Ambulate around nurses station 3x day. DVT proph, IS. DC drain.      Lisa Fruits, DO

## 2019-10-15 NOTE — PROGRESS NOTES
1950 - Bedside report received from Debi, [facility]. Patient in bed. Pain 3/10.     1950 - Patient in bed at this time. IV to L AC  intact and patent. Sequential compression device bilaterally. Dressing to JULIO site CDI. Incision to abd dry and intact, JEFFREY. + CMS. Pt A & O x 4. LS clear, on RA. Abdomen soft, NT and ND. + BS to all 4 quadrants. Denies nausea. Pain 5/10. Call light within reach. IV infiltrated and leaking. Will restart a new IV and hold onto the Dilaudid dose. 2200-Pt now with a new IV. The 1 mg of Dilaudid held earlier given. New orders noted and implemented. Will D/C JULIO later. 0036-Pt's JULIO finally taken out, took 2 RNs  to remove the sutures, I took out one, could not get out the other. Requested a 2nd RN, , Xochitl Sandhu, to help,  took out one more and did the drsg. Pt medicated with percocet after. Dickson. Well. Pt had uneventful shift. Uses IS every hour while awake. Pt ambulated without assistance. Pain remained well-controlled with medication, now taking PO pain meds. No issues/concerns at this time.  Call bell within reach

## 2019-10-16 LAB
ANION GAP SERPL CALC-SCNC: 5 MMOL/L (ref 3–18)
BASOPHILS # BLD: 0 K/UL (ref 0–0.1)
BASOPHILS NFR BLD: 0 % (ref 0–2)
BUN SERPL-MCNC: 14 MG/DL (ref 7–18)
BUN/CREAT SERPL: 14 (ref 12–20)
CALCIUM SERPL-MCNC: 8.6 MG/DL (ref 8.5–10.1)
CHLORIDE SERPL-SCNC: 99 MMOL/L (ref 100–111)
CO2 SERPL-SCNC: 31 MMOL/L (ref 21–32)
CREAT SERPL-MCNC: 1.01 MG/DL (ref 0.6–1.3)
DIFFERENTIAL METHOD BLD: ABNORMAL
EOSINOPHIL # BLD: 0.3 K/UL (ref 0–0.4)
EOSINOPHIL NFR BLD: 3 % (ref 0–5)
ERYTHROCYTE [DISTWIDTH] IN BLOOD BY AUTOMATED COUNT: 13.3 % (ref 11.6–14.5)
GLUCOSE SERPL-MCNC: 94 MG/DL (ref 74–99)
HCT VFR BLD AUTO: 42.2 % (ref 36–48)
HGB BLD-MCNC: 14.1 G/DL (ref 13–16)
LYMPHOCYTES # BLD: 1.1 K/UL (ref 0.9–3.6)
LYMPHOCYTES NFR BLD: 13 % (ref 21–52)
MAGNESIUM SERPL-MCNC: 1.8 MG/DL (ref 1.6–2.6)
MCH RBC QN AUTO: 28.7 PG (ref 24–34)
MCHC RBC AUTO-ENTMCNC: 33.4 G/DL (ref 31–37)
MCV RBC AUTO: 85.9 FL (ref 74–97)
MONOCYTES # BLD: 1 K/UL (ref 0.05–1.2)
MONOCYTES NFR BLD: 12 % (ref 3–10)
NEUTS SEG # BLD: 5.7 K/UL (ref 1.8–8)
NEUTS SEG NFR BLD: 72 % (ref 40–73)
PHOSPHATE SERPL-MCNC: 4.5 MG/DL (ref 2.5–4.9)
PLATELET # BLD AUTO: 246 K/UL (ref 135–420)
PMV BLD AUTO: 9.2 FL (ref 9.2–11.8)
POTASSIUM SERPL-SCNC: 4.1 MMOL/L (ref 3.5–5.5)
RBC # BLD AUTO: 4.91 M/UL (ref 4.7–5.5)
SODIUM SERPL-SCNC: 135 MMOL/L (ref 136–145)
WBC # BLD AUTO: 8 K/UL (ref 4.6–13.2)

## 2019-10-16 PROCEDURE — 83735 ASSAY OF MAGNESIUM: CPT

## 2019-10-16 PROCEDURE — 36415 COLL VENOUS BLD VENIPUNCTURE: CPT

## 2019-10-16 PROCEDURE — 80048 BASIC METABOLIC PNL TOTAL CA: CPT

## 2019-10-16 PROCEDURE — 74011250637 HC RX REV CODE- 250/637: Performed by: SURGERY

## 2019-10-16 PROCEDURE — 74011000258 HC RX REV CODE- 258: Performed by: SURGERY

## 2019-10-16 PROCEDURE — 84100 ASSAY OF PHOSPHORUS: CPT

## 2019-10-16 PROCEDURE — 85025 COMPLETE CBC W/AUTO DIFF WBC: CPT

## 2019-10-16 PROCEDURE — 74011250636 HC RX REV CODE- 250/636: Performed by: FAMILY MEDICINE

## 2019-10-16 PROCEDURE — 65270000029 HC RM PRIVATE

## 2019-10-16 RX ORDER — AMOXICILLIN 250 MG
1 CAPSULE ORAL DAILY
Status: DISCONTINUED | OUTPATIENT
Start: 2019-10-16 | End: 2019-10-18 | Stop reason: HOSPADM

## 2019-10-16 RX ADMIN — HYDROMORPHONE HYDROCHLORIDE 1 MG: 1 INJECTION, SOLUTION INTRAMUSCULAR; INTRAVENOUS; SUBCUTANEOUS at 18:30

## 2019-10-16 RX ADMIN — DEXTROSE MONOHYDRATE AND SODIUM CHLORIDE 50 ML/HR: 5; .9 INJECTION, SOLUTION INTRAVENOUS at 09:56

## 2019-10-16 RX ADMIN — MORPHINE SULFATE 2 MG: 2 INJECTION, SOLUTION INTRAMUSCULAR; INTRAVENOUS at 09:50

## 2019-10-16 RX ADMIN — Medication 10 ML: at 21:24

## 2019-10-16 RX ADMIN — SENNOSIDES, DOCUSATE SODIUM 1 TABLET: 50; 8.6 TABLET, FILM COATED ORAL at 09:50

## 2019-10-16 RX ADMIN — ONDANSETRON 4 MG: 2 INJECTION INTRAMUSCULAR; INTRAVENOUS at 18:14

## 2019-10-16 RX ADMIN — Medication 5 ML: at 06:00

## 2019-10-16 NOTE — PROGRESS NOTES
Bedside and Verbal shift change report given to Lisa Conroy RN (oncoming nurse) by Dahlia eLavitt RN (offgoing nurse). Report included the following information SBAR, Kardex, OR Summary, Intake/Output, MAR and Recent Results.

## 2019-10-16 NOTE — PROGRESS NOTES
Problem: Major Small and Large Bowel Procedure: Day of Surgery (Initiate SCIP Measures for Post-Op Care)  Goal: Activity/Safety  Outcome: Progressing Towards Goal  Goal: Nutrition/Diet  Outcome: Progressing Towards Goal  Goal: Medications  Outcome: Progressing Towards Goal  Goal: Respiratory  Outcome: Progressing Towards Goal  Goal: Psychosocial  Outcome: Progressing Towards Goal  Goal: *Optimal pain control at patient's stated goal  Outcome: Progressing Towards Goal  Goal: *Adequate urinary output (equal to or greater than 30 milliliters/hour)  Outcome: Progressing Towards Goal     Problem: Pain  Goal: *Control of Pain  Outcome: Progressing Towards Goal     Problem: Patient Education: Go to Patient Education Activity  Goal: Patient/Family Education  Outcome: Progressing Towards Goal     Problem: Falls - Risk of  Goal: *Absence of Falls  Description  Document Rosendo Nelson Fall Risk and appropriate interventions in the flowsheet. Outcome: Progressing Towards Goal  Note:   Fall Risk Interventions:  Mobility Interventions: Patient to call before getting OOB         Medication Interventions: Patient to call before getting OOB, Assess postural VS orthostatic hypotension    Elimination Interventions: Call light in reach              Problem: Patient Education: Go to Patient Education Activity  Goal: Patient/Family Education  Outcome: Progressing Towards Goal     Problem: Pressure Injury - Risk of  Goal: *Prevention of pressure injury  Description  Document Zaki Scale and appropriate interventions in the flowsheet.   Outcome: Progressing Towards Goal  Note:   Pressure Injury Interventions:  Sensory Interventions: Assess changes in LOC    Moisture Interventions: Absorbent underpads    Activity Interventions: Pressure redistribution bed/mattress(bed type)    Mobility Interventions: Pressure redistribution bed/mattress (bed type)    Nutrition Interventions: Document food/fluid/supplement intake    Friction and Shear Interventions: Minimize layers                Problem: Patient Education: Go to Patient Education Activity  Goal: Patient/Family Education  Outcome: Progressing Towards Goal     Problem: Nutrition Deficit  Goal: *Optimize nutritional status  Outcome: Progressing Towards Goal

## 2019-10-16 NOTE — PROGRESS NOTES
2145: Pt ambulated in the hallway. Requested pain medication, stating their abdomen was hurting more than when they had walked before. Shift summary: Pt ambulated in hallway. Only medicated for pain once. Pt slept throughout the shift. Patient Vitals for the past 12 hrs:   Temp Pulse Resp BP SpO2   10/16/19 0259 98.3 °F (36.8 °C) 82 16 130/65 98 %   10/16/19 0104     98 %   10/15/19 2229 99.2 °F (37.3 °C) 81 16 139/85 98 %   10/15/19 1917 98.5 °F (36.9 °C) 92 16 132/85 100 %     Bedside and Verbal shift change report given to Nusrat Leal RN (oncoming nurse) by Lj Finnegan RN (offgoing nurse). Report included the following information SBAR, MAR and Recent Results.

## 2019-10-16 NOTE — PROGRESS NOTES
503 Derick Garcia from [place], [place]. Resuming patient care at this time. Patient alert and oriented x 4. Abd midline incision clean/dry/intact. Dressing where JULIO drain was removed is clean/dry/intact also. 1814 Zofran given. Patient in restroom, stated he did not vomit but felt nauseous. Patient stated he had small BM. Patient flushed before it was seen. 1830 Dilaudid given for 6/10 abd pain after returning to bed. Reassessed and patient stated relief. Nausea also relieved. Patient Vitals for the past 12 hrs:   Temp Pulse Resp BP SpO2   10/16/19 1606 98.7 °F (37.1 °C) 88 17 135/84 99 %   10/16/19 1150 98.1 °F (36.7 °C) 83 18 126/77 100 %   10/16/19 0802 97.8 °F (36.6 °C) 83 16 133/74 98 %     Bedside and Verbal shift change report given to Linda Gunter RN (oncoming nurse) by Hesham Castillo RN (offgoing nurse). Report included the following information SBAR, Kardex, Intake/Output, MAR and Recent Results.

## 2019-10-16 NOTE — PROGRESS NOTES
Awaiting return of bowel function. No transition of care needs have been identified. Anticipate pt will transition home with physician follow up. No needs have been identified. CM remains available to assist.    Care Management Interventions  Mode of Transport at Discharge:  Other (see comment)()  Transition of Care Consult (CM Consult): Discharge Planning  Health Maintenance Reviewed: Yes  Confirm Follow Up Transport: Self  Plan discussed with Pt/Family/Caregiver: Yes  Discharge Location  Discharge Placement: Home with family assistance

## 2019-10-16 NOTE — PROGRESS NOTES
200- Patient up ambulating around the nurses station. 0900- Patient complaining of pain, pain meds given. Patient in bed resting at this time. 1100-Patient has been up ambulating, not complaing of any pain at this time. 1340- Patient has stated he passed gas but has not had a bowel movement at this time. 1510-Patient is resting in the bed comfortably, has ambulated and been up to the bathroom. Not complaining of any pain at this time.

## 2019-10-16 NOTE — PROGRESS NOTES
Received report alongside Jacqueline Humphrey RN from Larissa Lee RN. Precepting for Jacqueline Humphrey today.

## 2019-10-16 NOTE — PROGRESS NOTES
Bedside and Verbal shift change report given to Abida Mathis [facility] (oncoming nurse) by Aden Badillo RN (offgoing nurse). Report included the following information SBAR, Kardex, Intake/Output and MAR.

## 2019-10-16 NOTE — PROGRESS NOTES
Hospitalist Progress Note    Patient: Ivania Baker MRN: [id number]  CSN: [id number]    YOB: 1995  Age: 30 y.o. Sex: male    DOA: 10/8/2019 LOS:  LOS: 7 days                Assessment/Plan     Patient Active Problem List   Diagnosis Code    Volv[facility] of [facility]moid colon ([facility] K56.2    Constipation K59.00    Abdominal pain R10.9            29 yo male admitted for abdominal pain. Still with abdominal soreness, no acute events overnight. Sigmoid volvulus - s/p open colon resection, resection of colonic volvuls, ladds procedure, appendectomy. No flatus or BM  On clear liquids, however he is tolerating this. No vomiting. Ambulate  Advancing diet per general surgery. SCD    Disposition : 2-3 days    Review of systems  General: No fevers or chills. Cardiovascular: No chest pain or pressure. No palpitations. Pulmonary: No shortness of breath. Gastrointestinal: No nausea, vomiting. Physical Exam:  General: Awake, cooperative, no acute distress    HEENT: NC, Atraumatic. PERRLA, anicteric sclerae. Lungs: CTA Bilaterally. No Wheezing/Rhonchi/Rales. Heart:  S1 S2,  No murmur, No Rubs, No Gallops  Abdomen: Soft, Non distended, gen tender. Post op abdomen.    Extremities: No c/c/e  Psych:   Not anxious or agitated. Neurologic:  No acute neurological deficit. Vital signs/Intake and Output:  Visit Vitals  /84 (BP 1 Location: Right arm, BP Patient Position: At rest)   Pulse 88   Temp 98.7 °F (37.1 °C)   Resp 17   Ht 5' 10\" (1.778 m)   Wt 75.3 kg (166 lb 0.1 oz)   SpO2 99%   BMI 23.82 kg/m²     Current Shift:  No intake/output data recorded.   Last three shifts:  10/14 1901 - 10/16 0700  In: 2919.8 [P.O.:440; I.V.:2479.8]  Out: 2355 [Urine:2350; Drains:5]            Labs: Results:       Chemistry Recent Labs     10/16/19  0352 10/15/19  0222 10/14/19  0358   GLU 94 102* 106*   * 136 140   K 4.1 3.7 5.0   CL 99* 100 105   CO2 31 30 32   BUN 14 9 7   CREA 1.01 0. 85 0.81   CA 8.6 8.6 8.4*   AGAP 5 6 3   BUCR 14 11* 9*      CBC w/Diff Recent Labs     10/16/19  0352 10/15/19  0222 10/14/19  0358   WBC 8.0 8.1 6.3   RBC 4.91 5.00 4.73   HGB 14.1 14.3 13.3   HCT 42.2 43.2 41.7    237 187   GRANS 72 74* 71   LYMPH 13* 13* 14*   EOS 3 2 2      Cardiac Enzymes No results for input(s): CPK, CKND1, VERONICA in the last 72 hours. No lab exists for component: CKRMB, TROIP   Coagulation No results for input(s): PTP, INR, APTT, INREXT, INREXT in the last 72 hours. Lipid Panel No results found for: CHOL, CHOLPOCT, CHOLX, CHLST, CHOLV, 261435, HDL, HDLP, LDL, LDLC, DLDLP, 165941, VLDLC, VLDL, TGLX, TRIGL, TRIGP, TGLPOCT, CHHD, CHHDX   BNP No results for input(s): BNPP in the last 72 hours. Liver Enzymes No results for input(s): TP, ALB, TBIL, AP, SGOT, GPT in the last 72 hours.     No lab exists for component: DBIL   Thyroid Studies No results found for: T4, T3U, TSH, TSHEXT, TSHEXT     Procedures/imaging: see electronic medical records for all procedures/Xrays and details which were not copied into this note but were reviewed prior to creation of Plan

## 2019-10-16 NOTE — PROGRESS NOTES
Progress Note    Patient: Alexa Kirkpatrick MRN: [id number]  CSN: [id number]    YOB: 1995  Age: 30 y.o. Sex: male    DOA: 10/8/2019 LOS:  LOS: 6 days                    Subjective:     Patient states no problems with drain removal. No flatus. No N/V. Objective:      Visit Vitals  /85 (BP 1 Location: Right arm, BP Patient Position: At rest)   Pulse 81   Temp 99.2 °F (37.3 °C)   Resp 16   Ht 5' 10\" (1.778 m)   Wt 75.3 kg (166 lb 0.1 oz)   SpO2 98%   BMI 23.82 kg/m²       Physical Exam:  General appearance: Alert, no distress  Lungs: clear to auscultation bilaterally  Heart: regular rate and rhythm,   Abdomen: soft, appropriate tenderness, non distended  Extremities: extremities normal, atraumatic, no cyanosis or edema, calfs non-tender  Skin: Skin color, texture, turgor normal. No rashes or lesions  Psych: Alert, oriented x3, appropriate    Intake and Output:  Current Shift:  10/15 1901 - 10/16 0700  In: -   Out: 400 [Urine:400]  Last three shifts:  10/14 0701 - 10/15 1900  In: 8060 [P.O.:558; I.V.:1024]  Out: 1990 [LACQO:9834; Drains:40]    Marlon Darby Reviewed:  BMP:   Lab Results   Component Value Date/Time     10/15/2019 02:22 AM    K 3.7 10/15/2019 02:22 AM     10/15/2019 02:22 AM    CO2 30 10/15/2019 02:22 AM    AGAP 6 10/15/2019 02:22 AM     (H) 10/15/2019 02:22 AM    BUN 9 10/15/2019 02:22 AM    CREA 0.85 10/15/2019 02:22 AM    GFRAA >60 10/15/2019 02:22 AM    GFRNA >60 10/15/2019 02:22 AM     CBC:   Lab Results   Component Value Date/Time    WBC 8.1 10/15/2019 02:22 AM    HGB 14.3 10/15/2019 02:22 AM    HCT 43.2 10/15/2019 02:22 AM     10/15/2019 02:22 AM       Medications Reviewed.     Assessment/Plan     Principal Problem:    Volvulus of sigmoid colon ([facility] (10/9/2019)    Active Problems:    Constipation (10/9/2019)      Abdominal pain (10/9/2019)      Await bowel function  Pt not ambulating and not wearing his SCD despite repeated counseling on reasons.     Flo Bhavana, DO

## 2019-10-17 LAB
MAGNESIUM SERPL-MCNC: 2 MG/DL (ref 1.6–2.6)
PHOSPHATE SERPL-MCNC: 4.4 MG/DL (ref 2.5–4.9)

## 2019-10-17 PROCEDURE — 74011250636 HC RX REV CODE- 250/636: Performed by: FAMILY MEDICINE

## 2019-10-17 PROCEDURE — 36415 COLL VENOUS BLD VENIPUNCTURE: CPT

## 2019-10-17 PROCEDURE — 83735 ASSAY OF MAGNESIUM: CPT

## 2019-10-17 PROCEDURE — 74011250637 HC RX REV CODE- 250/637: Performed by: SURGERY

## 2019-10-17 PROCEDURE — 65270000029 HC RM PRIVATE

## 2019-10-17 PROCEDURE — 84100 ASSAY OF PHOSPHORUS: CPT

## 2019-10-17 RX ORDER — SODIUM CHLORIDE 9 MG/ML
125 INJECTION, SOLUTION INTRAVENOUS CONTINUOUS
Status: DISCONTINUED | OUTPATIENT
Start: 2019-10-17 | End: 2019-10-18 | Stop reason: HOSPADM

## 2019-10-17 RX ORDER — POLYETHYLENE GLYCOL 3350 17 G/17G
17 POWDER, FOR SOLUTION ORAL ONCE
Status: COMPLETED | OUTPATIENT
Start: 2019-10-17 | End: 2019-10-17

## 2019-10-17 RX ORDER — POLYETHYLENE GLYCOL 3350 17 G/17G
17 POWDER, FOR SOLUTION ORAL DAILY
Status: DISCONTINUED | OUTPATIENT
Start: 2019-10-17 | End: 2019-10-18 | Stop reason: HOSPADM

## 2019-10-17 RX ADMIN — Medication 10 ML: at 16:52

## 2019-10-17 RX ADMIN — ONDANSETRON 4 MG: 2 INJECTION INTRAMUSCULAR; INTRAVENOUS at 09:41

## 2019-10-17 RX ADMIN — POLYETHYLENE GLYCOL 3350 17 G: 17 POWDER, FOR SOLUTION ORAL at 09:39

## 2019-10-17 RX ADMIN — ONDANSETRON 4 MG: 2 INJECTION INTRAMUSCULAR; INTRAVENOUS at 18:20

## 2019-10-17 RX ADMIN — OXYCODONE HYDROCHLORIDE AND ACETAMINOPHEN 2 TABLET: 5; 325 TABLET ORAL at 16:50

## 2019-10-17 RX ADMIN — OXYCODONE HYDROCHLORIDE AND ACETAMINOPHEN 1 TABLET: 5; 325 TABLET ORAL at 02:21

## 2019-10-17 RX ADMIN — Medication 10 ML: at 05:38

## 2019-10-17 RX ADMIN — SODIUM CHLORIDE 125 ML/HR: 900 INJECTION, SOLUTION INTRAVENOUS at 21:00

## 2019-10-17 RX ADMIN — SODIUM CHLORIDE 1000 ML: 900 INJECTION, SOLUTION INTRAVENOUS at 21:00

## 2019-10-17 NOTE — ROUTINE PROCESS
Bedside and Verbal shift change report given to Diana Conley RN (oncoming nurse) by Rodrigo Corrales RN (offgoing nurse). Report included the following information SBAR and Kardex.

## 2019-10-17 NOTE — ROUTINE PROCESS
Bedside and Verbal shift change report given to Rohini Kay RN (oncoming nurse) by Joby Mcallister RN (offgoing nurse). Report included the following information SBAR, Kardex, ED Summary, OR Summary, Procedure Summary, Intake/Output, MAR, Recent Results and Med Rec Status.

## 2019-10-17 NOTE — PROGRESS NOTES
NUTRITION FOLLOW-UP    RECOMMENDATIONS/PLAN:   - Other: continue w/ POC  -Monitor labs/lytes, BM, PO intake, skin integrity, wt, fluid status    NUTRITION ASSESSMENT:   Client Update: 30 yrs old Male with abdominal pain and constipation, volvulus of sigmoid colon, POP 3 resection of colonic volvulus parasthesias      FOOD/NUTRITION INTAKE   Diet Order:  Regular   Supplements: none  Food Allergies: NKFA/  Average PO Intake:      Patient Vitals for the past 100 hrs:   % Diet Eaten   10/16/19 1800 5 %   10/15/19 0341 0 %   10/14/19 2237 0 %   10/14/19 1551 100 %   10/13/19 1920 100 %   10/13/19 0855 100 %      Pertinent Medications:  [x] Reviewed; miralax, pericolace, zofran  Electrolyte Replacement Protocol: []K []Mg []PO4  Insulin:  []SSI  []Pre-meal   []Basal    []Drip  []None  Cultural/Alevism Food Preferences: None Identified    BIOCHEMICAL DATA & MEDICAL TESTS  Pertinent Labs:  [x] Reviewed; ANTHROPOMETRICS  Height: 5' 10\" (177.8 cm)       Weight: 74.1 kg (163 lb 4.8 oz)         BMI: 23.4 kg/m^2 normal weight (18.5%-24.9% BMI)   Adm Weight: 166 lbs                Weight change: -3# x8d, pt was NPO/clear 7 days  Adjusted Body Weight: n/a     NUTRITION-FOCUSED PHYSICAL ASSESSMENT  Skin: No PU     GI: +BM 10/16    NUTRITION PRESCRIPTION  Calories: 8424-6751 kcal/day based on 25-30 kcal/kg  Protein: 60 g/day based on.8 g/kg  Fluid: 9704-1270 ml/day based on 1 kcal/ml      NUTRITION DIAGNOSES:   1. At risk of malnutrition related to LOS as evidenced by LOS day 8. NUTRITION INTERVENTIONS:   INTERVENTIONS:        GOALS:  1. Continue w/ POC 1.  Encourage PO intake >75%  by next review 5 days     LEARNING NEEDS (Diet, Supplementation, Food/Nutrient-Drug Interaction):   [] None Identified   [] Education provided/documented      Identified and patient: [] Declined   [x] Was not appropriate/indicated        NUTRITION MONITORING /EVALUATION:   Follow PO intake  Monitor wt  Monitor renal labs, electrolytes, fluid status  Monitor for additional supplement needs     Previous Recommendations Implemented: yes        Previous Goals Met:  yes       [] Participated in Interdisciplinary Rounds    [] Interdisciplinary Care Plan Reviewed  DISCHARGE NUTRITION RECOMMENDATIONS ADDRESSED:      [x] To be determined closer to discharge    NUTRITION RISK:           [x] At risk                        [] Not currently at risk        Will follow-up per policy.   Jud Long 9

## 2019-10-17 NOTE — PROGRESS NOTES
Hospitalist Progress Note    Patient: Ivania Baker MRN: [id number]  CSN: [id number]    YOB: 1995  Age: 30 y.o. Sex: male    DOA: 10/8/2019 LOS:  LOS: 8 days          Chief Complaint:    Abd pain        Assessment/Plan     Sigmoid Volvulus resolved  Resection of colonic volvulus     Surgical site looks good  Not much appetite   Complains of gas pains but is walking a lot    Add one dose miralax today    Expect he can dc very soon  See if he can move bowels today    Reg diet        Disposition :  Patient Active Problem List   Diagnosis Code    Volvulus of sigmoid colon ([facility] K56.2    Constipation K59.00    Abdominal pain R10.9       Subjective:    Gas pains let lower abd  Passed gas   No nausea/vomiting  Ate some eggs this am    Review of systems:      Cardiovascular: denies chest pain, palpitations  Gastrointestinal: denies nausea, vomiting, diarrhea      Vital signs/Intake and Output:  Visit Vitals  /75 (BP 1 Location: Left arm, BP Patient Position: At rest)   Pulse 94   Temp 98.4 °F (36.9 °C)   Resp 17   Ht 5' 10\" (1.778 m)   Wt 74.1 kg (163 lb 4.8 oz)   SpO2 97%   BMI 23.43 kg/m²     Current Shift:  No intake/output data recorded.   Last three shifts:  10/15 1901 - 10/17 0700  In: 2055.8 [I.V.:2055.8]  Out: 1200 [Urine:1200]    Exam:    General: Well developed, alert, NAD, OX3  CVS:Regular rate and rhythm, no M/R/G, S1/S2 heard, no thrill  Lungs:Clear to auscultation bilaterally, no wheezes, rhonchi, or rales  Abdomen: Soft, Nontender, surg site CDI, few normal BS, ND  Extremities: No C/C/E, pulses palpable 2+  Neuro:grossly normal , follows commands  Psych:appropriate                Labs: Results:       Chemistry Recent Labs     10/16/19  0352 10/15/19  0222   GLU 94 102*   * 136   K 4.1 3.7   CL 99* 100   CO2 31 30   BUN 14 9   CREA 1.01 0.85   CA 8.6 8.6   AGAP 5 6   BUCR 14 11*      CBC w/Diff Recent Labs     10/16/19  0352 10/15/19  0222   WBC 8.0 8.1   RBC 4.91 5.00 HGB 14.1 14.3   HCT 42.2 43.2    237   GRANS 72 74*   LYMPH 13* 13*   EOS 3 2      Cardiac Enzymes No results for input(s): CPK, CKND1, VERONICA in the last 72 hours. No lab exists for component: CKRMB, TROIP   Coagulation No results for input(s): PTP, INR, APTT, INREXT in the last 72 hours. Lipid Panel No results found for: CHOL, CHOLPOCT, CHOLX, CHLST, CHOLV, 993887, HDL, HDLP, LDL, LDLC, DLDLP, 782191, VLDLC, VLDL, TGLX, TRIGL, TRIGP, TGLPOCT, CHHD, CHHDX   BNP No results for input(s): BNPP in the last 72 hours. Liver Enzymes No results for input(s): TP, ALB, TBIL, AP, SGOT, GPT in the last 72 hours.     No lab exists for component: DBIL   Thyroid Studies No results found for: T4, T3U, TSH, TSHEXT     Procedures/imaging: see electronic medical records for all procedures/Xrays and details which were not copied into this note but were reviewed prior to creation of Myron Diana MD

## 2019-10-17 NOTE — ROUTINE PROCESS
Bedside and Verbal shift change report given to Esperanza Ling RN (oncoming nurse) by Mia Lee RN (offgoing nurse). Report included the following information SBAR and Kardex.

## 2019-10-17 NOTE — PROGRESS NOTES
Progress Note    Patient: Marcus Herzog MRN: [id number]  CSN: [id number]    YOB: 1995  Age: 30 y.o. Sex: male    DOA: 10/8/2019 LOS:  LOS: 7 days                    Subjective:     Patient states no problems feels gurgling No flatus. No N/V. Objective:      Visit Vitals  /79 (BP 1 Location: Right arm, BP Patient Position: At rest)   Pulse 80   Temp 98 °F (36.7 °C)   Resp 17   Ht 5' 10\" (1.778 m)   Wt 75.3 kg (166 lb 0.1 oz)   SpO2 99%   BMI 23.82 kg/m²       Physical Exam:  General appearance: Alert, no distress  Lungs: clear to auscultation bilaterally  Heart: regular rate and rhythm,   Abdomen: soft, appropriate tenderness, non distended  Extremities: extremities normal, atraumatic, no cyanosis or edema, calfs non-tender  Skin: Skin color, texture, turgor normal. No rashes or lesions  Psych: Alert, oriented x3, appropriate    Intake and Output:  Current Shift:  No intake/output data recorded. Last three shifts:  10/15 0701 - 10/16 1900  In: 3079.8 [I.V.:3079.8]  Out: 750 [Urine:750]    Lab/Data Reviewed:  BMP:   Lab Results   Component Value Date/Time     (L) 10/16/2019 03:52 AM    K 4.1 10/16/2019 03:52 AM    CL 99 (L) 10/16/2019 03:52 AM    CO2 31 10/16/2019 03:52 AM    AGAP 5 10/16/2019 03:52 AM    GLU 94 10/16/2019 03:52 AM    BUN 14 10/16/2019 03:52 AM    CREA 1.01 10/16/2019 03:52 AM    GFRAA >60 10/16/2019 03:52 AM    GFRNA >60 10/16/2019 03:52 AM     CBC:   Lab Results   Component Value Date/Time    WBC 8.0 10/16/2019 03:52 AM    HGB 14.1 10/16/2019 03:52 AM    HCT 42.2 10/16/2019 03:52 AM     10/16/2019 03:52 AM       Medications Reviewed. Assessment/Plan     Principal Problem:    Volvulus of sigmoid colon ([address] (10/9/2019)    Active Problems:    Constipation (10/9/2019)      Abdominal pain (10/9/2019)      Await bowel function  Pt not ambulating and not wearing his SCD despite repeated counseling on reasons.     Roxi Solitario,

## 2019-10-17 NOTE — PROGRESS NOTES
2346-Resting quietly in bed. Stable. Call light and personal items within reach. White board updated. Midline abdominal incision well approximated, clear adhesive, no drainage. Assessment complete. 1176-No change from initial assessment. Stable. Denies need for pain medication at this time. Complains of abdominal pain being \"gas\" and denies need for pain at this time. Resting quietly in bed. Call light within reach. Shift Summary:  Uneventful shift. Rested quietly. Stable.

## 2019-10-17 NOTE — PROGRESS NOTES
Received bedside shift report from Michael Boykin RN. Pt A/O x4, in bed sleeping No complaints of pain or nausea. 0800-Nurse received call from Dr. Jovanny Benjamin stated to hold pericolace, instead to try Mirilax. Pt did not consume much of meals today  Pt requested nausea 2x during shift  Pt requested pain medication 1x during shift  Observed Pt ambulating hallway twice during shift. Pt using IS independently     Patient Vitals for the past 12 hrs:   Temp Pulse Resp BP SpO2   10/17/19 1839 98.2 °F (36.8 °C) 92 16 150/86 100 %   10/17/19 1417 99.4 °F (37.4 °C) 85 16 146/85 98 %   10/17/19 1057 98.2 °F (36.8 °C) 84 16 143/90 98 %       Bedside and Verbal shift change report given to Cadence Sutherland RN (oncoming nurse) by Raysa Griffiht Rn and Ed Valadez RN (offgoing nurse). Report included the following information SBAR, Kardex, Intake/Output and MAR.

## 2019-10-18 VITALS
DIASTOLIC BLOOD PRESSURE: 75 MMHG | OXYGEN SATURATION: 100 % | HEART RATE: 93 BPM | HEIGHT: 70 IN | WEIGHT: 166.01 LBS | BODY MASS INDEX: 23.77 KG/M2 | TEMPERATURE: 98.4 F | RESPIRATION RATE: 16 BRPM | SYSTOLIC BLOOD PRESSURE: 128 MMHG

## 2019-10-18 LAB
MAGNESIUM SERPL-MCNC: 1.8 MG/DL (ref 1.6–2.6)
PHOSPHATE SERPL-MCNC: 3.6 MG/DL (ref 2.5–4.9)

## 2019-10-18 PROCEDURE — 74011250637 HC RX REV CODE- 250/637: Performed by: SURGERY

## 2019-10-18 PROCEDURE — 90686 IIV4 VACC NO PRSV 0.5 ML IM: CPT | Performed by: INTERNAL MEDICINE

## 2019-10-18 PROCEDURE — 74011250636 HC RX REV CODE- 250/636: Performed by: INTERNAL MEDICINE

## 2019-10-18 PROCEDURE — 90471 IMMUNIZATION ADMIN: CPT

## 2019-10-18 PROCEDURE — 36415 COLL VENOUS BLD VENIPUNCTURE: CPT

## 2019-10-18 PROCEDURE — 74011250637 HC RX REV CODE- 250/637: Performed by: HOSPITALIST

## 2019-10-18 PROCEDURE — 83735 ASSAY OF MAGNESIUM: CPT

## 2019-10-18 PROCEDURE — 84100 ASSAY OF PHOSPHORUS: CPT

## 2019-10-18 RX ORDER — OXYCODONE AND ACETAMINOPHEN 5; 325 MG/1; MG/1
1-2 TABLET ORAL
Qty: 8 TAB | Refills: 0 | Status: SHIPPED | OUTPATIENT
Start: 2019-10-18 | End: 2019-10-21

## 2019-10-18 RX ORDER — AMOXICILLIN 250 MG
1 CAPSULE ORAL DAILY
Qty: 20 TAB | Refills: 0 | Status: SHIPPED | OUTPATIENT
Start: 2019-10-18

## 2019-10-18 RX ORDER — ONDANSETRON 4 MG/1
4 TABLET, FILM COATED ORAL
Qty: 10 TAB | Refills: 0 | Status: SHIPPED | OUTPATIENT
Start: 2019-10-18

## 2019-10-18 RX ADMIN — SENNOSIDES, DOCUSATE SODIUM 1 TABLET: 50; 8.6 TABLET, FILM COATED ORAL at 09:12

## 2019-10-18 RX ADMIN — POLYETHYLENE GLYCOL 3350 17 G: 17 POWDER, FOR SOLUTION ORAL at 09:12

## 2019-10-18 RX ADMIN — INFLUENZA VIRUS VACCINE 0.5 ML: 15; 15; 15; 15 SUSPENSION INTRAMUSCULAR at 09:17

## 2019-10-18 NOTE — PROGRESS NOTES
Progress Note    Patient: Ezekiel Jones MRN: [id number]  CSN: [id number]    YOB: 1995  Age: 30 y.o. Sex: male    DOA: 10/8/2019 LOS:  LOS: 9 days                    Subjective:     Patient states feels OK, had BM. Objective:      Visit Vitals  /70 (BP 1 Location: Left arm, BP Patient Position: At rest)   Pulse 82   Temp 98.1 °F (36.7 °C)   Resp 16   Ht 5' 10\" (1.778 m)   Wt 75.3 kg (166 lb 0.1 oz)   SpO2 100%   BMI 23.82 kg/m²       Physical Exam:  General appearance: Alert, no distress  Lungs: clear to auscultation bilaterally  Heart: regular rate and rhythm, S1, S2 normal, no murmur, click, rub or gallop  Abdomen: soft, appropriate tenderness, non distended  Extremities: extremities normal, atraumatic, no cyanosis or edema, calfs non-tender  Skin: Skin color, texture, turgor normal. No rashes or lesions  Psych: Alert, oriented x3, appropriate    Intake and Output:  Current Shift:  No intake/output data recorded. Last three shifts:  10/16 1901 - 10/18 0700  In: 1486 [P.O.:236; I.V.:1250]  Out: 800 [Urine:800]    Lab/Data Reviewed: All lab results for the last 24 hours reviewed. Medications Reviewed.     Assessment/Plan     Principal Problem:    Volvulus of sigmoid colon ([address] (10/9/2019)    Active Problems:    Constipation (10/9/2019)      Abdominal pain (10/9/2019)      OK to D/C, f/u with Dr. Addison Jett

## 2019-10-18 NOTE — PROGRESS NOTES
2000: Contacted Dr Ann Peng about pt not being able to tolerate diet well, changed diet to clear liquid while eating whatever they can tolerate without being nauseous. Pt given bolus and will be reconnected to fluids. Pt asking about when they can go home and was informed that they need to be able to tolerate regular diet to go home. Pt stated that they have had a small bowel movement and has passed gas. 2198: Pt had loose, watery bowel movement. Shift summary: Pt changed to clear liquid diet, however is able to eat whatever they can tolerate as per Dr Ann Peng. Pt complaining of gas pain but did not want any pain medication. Patient Vitals for the past 12 hrs:   Temp Pulse Resp BP SpO2   10/18/19 0324 98.1 °F (36.7 °C) 82 16 132/70 100 %   10/17/19 2215 97.9 °F (36.6 °C) 78 16 140/84 100 %   10/17/19 2112     100 %   10/17/19 1839 98.2 °F (36.8 °C) 92 16 150/86 100 %     Bedside and Verbal shift change report given to Jonathan Murray RN (oncoming nurse) by Amarilys Aguilera RN (offgoing nurse). Report included the following information SBAR, Intake/Output, MAR and Recent Results.

## 2019-10-18 NOTE — DISCHARGE SUMMARY
[facility] SUMMARY    Name:  Rob Monday  MR#:   [id number]  :  1995  ACCOUNT #:  [de-identified]  ADMIT DATE:  10/08/2019  DISCHARGE DATE:  10/18/2019    DISCHARGE DIAGNOSES:  1.  Volvulus of the sigmoid colon. 2.  Resection of sigmoid volvulus. 3.  Chronic constipation. HOSPITAL CONSULTANTS:  1. Jennifer Ojeda DO, Colorectal Surgery. [address] [place] [address] MD Trice, GI.    HOSPITAL SUMMARY:  This is a 76-year-old active [facility]  gentleman, who came into the emergency room because of worsening abdominal pain, abdominal bloating, nausea and constipation. He has had intermittent GI symptoms for many years, usually able to help pass gas by putting his legs up on the wall as he lays on his bed. Family lives in [facility].  He was diagnosed with a sigmoid volvulus in the emergency room and ultimately was admitted for decompression with GI and then recommendations by Colorectal Surgery to have resection due to probable complications resulting from recurrence of this volvulus. He ultimately weighed the risks and benefits of proceeding to surgery and opted to do so. Extensive discussion was held with the patient as well as his commanding officer. He did very nicely through surgery. He had an open colon resection with resection of the colonic volvulus and a Rutherford's procedure as well as an appendectomy. This was performed on 10/11. The patient has been recovering nicely, incision looks good. He was starting to pass some gas the last couple of days and had a bowel movement, small one yesterday, and another one this morning. Appetite still poor. He is taking in liquids. No nausea or vomiting. He is hesitant to advance to a regular diet considering his issues, but overall, his abdomen is soft, nontender. Incision looks clean, dry and intact. He has bowel sounds that are few, but normal.  He has been cleared by Surgery for discharge.     At this point, he is hemodynamically stable, awake and alert, in no acute distress. Lungs are clear bilaterally. Cardiac exam, regular rate and rhythm. No murmur, rub, or gallop. Blood pressure 128/75, pulse 93, temperature 98.4, for respiratory rate 16, SaO2 100% on room air. Lower extremities are without edema. Most recent labs showed a normal CBC and normal metabolic panel. Phos and mag are within normal limits as well as glucose. He is stable to discharge from the hospital.  I have prescribed 8 tablets of Percocet to take one every 6 hours as needed for severe pain, Zofran 4 mg every 8 hours as needed for nausea, and he is to continue Liv-Colace 1 tablet daily. He is to follow up with Dr. Arturo Goncalves on 10/23 as [facility] with [facility] in 2-3 days after discharge from here. He has an appointment that has been scheduled already for the 16th, but that needs to be rescheduled and further instructions are to avoid lifting more than 10 pounds, be out of physical exercise until he follows up with Surgery, and then further recommendations on physical activity thereafter per Colorectal Surgery. He is stable to go to a regular diet. He is ready for discharge. 35 minutes discharge time.       Angie Shrestha MD      [id number]  D:  10/18/2019 10:41  T:  10/18/2019 11:42  JOB #:  [id number]

## 2019-10-18 NOTE — PROGRESS NOTES
Problem: Major Small and Large Bowel Procedure: Day of Surgery (Initiate SCIP Measures for Post-Op Care)  Goal: Activity/Safety  Outcome: Resolved/Met  Goal: Nutrition/Diet  Outcome: Resolved/Met  Goal: Medications  Outcome: Resolved/Met  Goal: Respiratory  Outcome: Resolved/Met  Goal: Psychosocial  Outcome: Resolved/Met  Goal: *Optimal pain control at patient's stated goal  Outcome: Resolved/Met  Goal: *Adequate urinary output (equal to or greater than 30 milliliters/hour)  Outcome: Resolved/Met     Problem: Pain  Goal: *Control of Pain  Outcome: Resolved/Met     Problem: Patient Education: Go to Patient Education Activity  Goal: Patient/Family Education  Outcome: Resolved/Met     Problem: Falls - Risk of  Goal: *Absence of Falls  Description  Document Alice Fall Risk and appropriate interventions in the flowsheet. Outcome: Resolved/Met  Note:   Fall Risk Interventions:  Mobility Interventions: Patient to call before getting OOB         Medication Interventions: Teach patient to arise slowly    Elimination Interventions: Call light in reach              Problem: Patient Education: Go to Patient Education Activity  Goal: Patient/Family Education  Outcome: Resolved/Met     Problem: Pressure Injury - Risk of  Goal: *Prevention of pressure injury  Description  Document Zaki Scale and appropriate interventions in the flowsheet.   Outcome: Resolved/Met  Note:   Pressure Injury Interventions:  Sensory Interventions: Pressure redistribution bed/mattress (bed type)    Moisture Interventions: Absorbent underpads    Activity Interventions: Pressure redistribution bed/mattress(bed type)    Mobility Interventions: Pressure redistribution bed/mattress (bed type)    Nutrition Interventions: Document food/fluid/supplement intake    Friction and Shear Interventions: Apply protective barrier, creams and emollients, HOB 30 degrees or less                Problem: Patient Education: Go to Patient Education Activity  Goal: Patient/Family Education  Outcome: Resolved/Met     Problem: Nutrition Deficit  Goal: *Optimize nutritional status  Outcome: Resolved/Met

## 2019-10-18 NOTE — DISCHARGE INSTRUCTIONS
Patient Education        Constipation: Care Instructions  Your Care Instructions    Constipation means that you have a hard time passing stools (bowel movements). People pass stools from 3 times a day to once every 3 days. What is normal for you may be different. Constipation may occur with pain in the rectum and cramping. The pain may get worse when you try to pass stools. Sometimes there are small amounts of bright red blood on toilet paper or the surface of stools. This is because of enlarged veins near the rectum (hemorrhoids). A few changes in your diet and lifestyle may help you avoid ongoing constipation. Your doctor may also prescribe medicine to help loosen your stool. Some medicines can cause constipation. These include pain medicines and antidepressants. Tell your doctor about all the medicines you take. Your doctor may want to make a medicine change to ease your symptoms. Follow-up care is a key part of your treatment and safety. Be sure to make and go to all appointments, and call your doctor if you are having problems. It's also a good idea to know your test results and keep a list of the medicines you take. How can you care for yourself at home? · Drink plenty of fluids, enough so that your urine is light yellow or clear like water. If you have kidney, heart, or liver disease and have to limit fluids, talk with your doctor before you increase the amount of fluids you drink. · Include high-fiber foods in your diet each day. These include fruits, vegetables, beans, and whole grains. · Get at least 30 minutes of exercise on most days of the week. Walking is a good choice. You also may want to do other activities, such as running, swimming, cycling, or playing tennis or team sports. · Take a fiber supplement, such as Citrucel or Metamucil, every day. Read and follow all instructions on the label. · Schedule time each day for a bowel movement. A daily routine may help.  Take your time having your bowel movement. · Support your feet with a small step stool when you sit on the toilet. This helps flex your hips and places your pelvis in a squatting position. · Your doctor may recommend an over-the-counter laxative to relieve your constipation. Examples are Milk of Magnesia and MiraLax. Read and follow all instructions on the label. Do not use laxatives on a long-term basis. When should you call for help? Call your doctor now or seek immediate medical care if:    · You have new or worse belly pain.     · You have new or worse nausea or vomiting.     · You have blood in your stools.    Watch closely for changes in your health, and be sure to contact your doctor if:    · Your constipation is getting worse.     · You do not get better as expected. Where can you learn more? Go to http://rah-ilir.info/. Enter 21 [phone] in the search box to learn more about \"Constipation: Care Instructions. \"  Current as of: June 26, 2019  Content Version: 12.2  © 8227-2623 [employer]. Care instructions adapted under license by [employer] (which disclaims liability or warranty for this information). If you have questions about a medical condition or this instruction, always ask your healthcare professional. [address] any warranty or liability for your use of this information.

## 2019-10-18 NOTE — PROGRESS NOTES
Patient stable overnight. Small bowel movement. Complaining of gassy abdominal pain. Afebrile vital signs stable. Abdomen Nondistended. Wound cdi. Await BM. Advanced diet to regular and it tolerated DC home.

## 2019-10-18 NOTE — PROGRESS NOTES
Bedside shift change report given to Noah Appiah (oncoming nurse) by Randall Julio RN (offgoing nurse). Report included the following information SBAR, Kardex, Procedure Summary, Intake/Output and MAR.

## 2019-10-18 NOTE — PROGRESS NOTES
Received report alongside Jonnathan Harris RN from Vinnie Llanes RN. Precepting for Jonnathan Harris today.

## 2019-10-22 NOTE — ADT AUTH CERT NOTES
Patient Demographics Patient Name Mandeep Roldan, [address] 
[id number] Sex Male  
1995 Address  [place] [id number] Phone [phone] (Home) CSN:  
[id number] Admit Date: Admit Time Room Bed Oct 8, 2019 10:36  [36552] 01 [14870] Attending Providers Provider Pager From To Dustin Owen MD  10/08/19 10/09/19 Leonel Xie MD  10/09/19 10/18/19 Emergency Contact(s) Name Relation Home Work Mobile  
colon, [address] Spouse   [phone] Utilization Reviews  
 
   
LOC:Acute Adult-General Surgical (10/15/2019) by Mar Gómez RN  
 
   
Review Entered Review Status 10/16/2019 12:49 In Primary  
   
Criteria Review REVIEW SUMMARY 
  
Patient: Stanford Wright Review Number: [id number] Review Status: In Primary 
  
Condition Specific: Yes 
  
Condition Level Of Care Code: ACUTE Condition Level Of Care Description: Acute 
  
  
OUTCOMES Outcome Type: Primary 
  
  
  
REVIEW DETAILS 
  
Service Date: 10/15/2019 Admit Date: 10/09/2019 Product: Lakeshia Heath Adult Subset: General Surgical 
    (Symptom or finding within 24h) 
  (Excludes PO medications unless noted) [X] Select Day, One: 
            [X] Post-op Day 3-21, One: 
                [X] ACUTE, One: 
                    [X] Partial responder, not clinically stable for discharge and requires continued stay, One: 
                        [X] Routine post-op stay, Both: 
                            [X] Surgical stay, One: 
                            ~--Admin, IQ Admin Admin on 10- 12:48 PM--~ Assessment/Plan 
                              
                            Patient Active Problem List 
                            Diagnosis         Code           Volvulus of sigmoid colon ([facility] [address] K56.2                                       Constipation      K59.00 
           Abdominal pain  R10.9 
                              
                              
                              
                              
                            31 yo male admitted for abdominal pain. 
                              
                            Still with abdominal soreness, no acute events overnight. 
                              
                            Sigmoid volvulus - s/p open colon resection, resection of colonic volvuls, ladds procedure, appendectomy. No flatus or BM On clear liquids Ambulate Advancing diet per general surgery.  
                              
                            SCD 
                              
                            Disposition : 2-3 days 
                              
                            Review of systems General: No fevers or chills. Cardiovascular: No chest pain or pressure. No palpitations. Pulmonary: No shortness of breath. Gastrointestinal: No nausea, vomiting.  
                              
                              
                            Physical Exam: 
                            General:         Awake, cooperative, no acute distress   
                            HEENT:           NC, Atraumatic. PERRLA, anicteric sclerae. Lungs:            CTA Bilaterally. No Wheezing/Rhonchi/Rales. Heart:              S1 S2,  No murmur, No Rubs, No Gallops Abdomen:      Soft, Non distended, gen tender. Post op abdomen.   
                            Extremities:   No c/c/e Psych:            Not anxious or agitated. Neurologic:    No acute neurological deficit.    
                              
                              
                              
                            Vital signs/Intake and Output: 
                            Visit Vitals BP       127/78 Pulse  87 Temp  98.3 °F (36.8 °C) Resp   16 Ht        5' 10\" (1.778 m) Wt       74.5 kg (164 lb 4.8 oz) SpO2  98% BMI     23.57 kg/m² 
                              LABS Glucose 102 NO IMAGES 
                             
                             
                             
                                [X] Moderate stay within the last 5d [X] Expected post-op course, One: 
                                [X] General surgery, All: 
                                    [X] Deep vein thrombosis (DVT) prophylaxis or patient ambulatory ~--Admin,  Pin [place] Jean on 10- 12:48 PM--~ 
                                    SCD [X] Hydration or nutrition, One: 
                                        [ ] NPO and IV fluid and, One: 
                                        ~--Admin, IQ Admin Admin on 10- 12:49 PM--~ 
                                        D5NS @ 50 [X] Advancing diet as tolerated or nutritional route established ~--Admin,  Pin [place] Jean on 10- 12:48 PM--~ Clear liquids [X] Mobility advancing as tolerated [X] Pain assessment, One: 
                                        [X] Pain controlled ~--Admin, IQ Admin Admin on 10- 12:49 PM--~ 
                                        Dilaudid 1mg IV prn Moprhine 2mg IV prn 
                                         
                                         
                                         
  
Version: InterQual® 2019 Rowena Cheng  © 2019 [employer] and/or one of its [place]. All Rights Reserved. CPT only © 2018 American Medical Association. All Rights Reserved.  
   
LOC:Acute Adult-General Surgical (10/12/2019) by Christo Warren RN  
 
   
Review Entered Review Status 10/14/2019 12:20 In Primary  
   
Criteria Review REVIEW SUMMARY 
  
Patient: Greg Hernandez Review Number: [id number] Review Status: In Primary 
  
Condition Specific: Yes 
  
Condition Level Of Care Code: ACUTE Condition Level Of Care Description: Acute 
  
  
OUTCOMES Outcome Type: Primary 
  
  
  
REVIEW DETAILS 
  
Service Date: 10/12/2019 Admit Date: 10/09/2019 Product: Venia Radish Adult Subset: General Surgical 
    (Symptom or finding within 24h) 
  (Excludes PO medications unless noted)         [X] Select Day, One: 
            [X] Post-op Day 1, One: 
                [X] ACUTE, One: 
                ~--Admin, IQ Admin Admin on 10- 12:20 PM--~ 
                10/12/19- 
                 
 VS:  99.3- P- 91- R- 16- 123/51. O2 sat= 99% on 2 lpm. 
                 
                Abnormal Labs: 
                WBC= 17.4. RBC= 4.65. Glucose= 150. BUN/Cr ratio= 11. Ca= 7.7. ALP= 44. T. protein= 6.1.  albumin= 3.2. Head CT=  No acute intracranial abnormalities. Attending Note: The paresthesias in his right hand are getting better and likely from positioning. Will watch. Physical Exam: 
                General:  NAD, AAOx3. Non-toxic. HEENT: NC/AT. PERRLA, EOMI.  MMM. Lungs: Nml inspection. CTA B/L. No wheezing, rales or rhonchi. Heart:  S1S2 RRR,  PMI mid 5th IC space. No M/RG. Abdomen:  Soft, NT/ND.  BS+. No peritoneal signs. Extremities: No C/C/E. Psych:   Nml affect. Neurologic:  2-12 intact. Strength 5/5 throughout. Sensation symmetrical. 
                 
                Assessment/Plan: 
                Principal Problem: 
                Volvulus of sigmoid colon ([address] (10/9/2019) Active Problems: 
                Constipation (10/9/2019) Abdominal pain (10/9/2019) Colon-rectal Surgery note: 
                Physical Exam: 
                General appearance: Alert, no distress Lungs: clear to auscultation bilaterally Heart: regular rate and rhythm, S1, S2 normal, no murmur, click, rub or gallop Abdomen: soft, appropriate tenderness, non distended Extremities: extremities normal, atraumatic, no cyanosis or edema, calfs non-tender Skin: Skin color, texture, turgor normal. No rashes or lesions Psych: Alert, oriented x3, appropriate Assessment/Plan: Principal Problem: 
                Volvulus of sigmoid colon ([address] (10/9/2019) Active Problems: 
                Constipation (10/9/2019) Abdominal pain (10/9/2019) Elevated WBC from intra-operative steroids. Pt stable. Continue current management. Slow clears. Pain control. Additional Orders: 
                Medical Unit/ IP. GI consult. Full code. D/c goddard. Glucose prn. Continuous VS.  Daily CBC/BMP/Mg/Phos. JULIO. D 5 NS at 100 ml/hr. Benadryl 12.5 mg iv Q 6 prn. Ca gluconate 1 G iv x 1. Mg Sulfate 2 G iv x 1. [X] Moderate or long stay surgery and expected post-op course and, All: 
                    ~--Admin, IQ Admin Admin on 10- 12:19 PM--~ 
                    S/p OPEN COLON RESECTION - RESECTION OF COLONIC VOLVULUS, LADDS PROCEDURE, APPENDECTOMY on 10/11/19. [X] Deep vein thrombosis (DVT) prophylaxis or patient ambulatory [X] Hydration or nutrition, One: 
                            [X] Advancing diet as tolerated or nutritional route established 
                            ~--Admin, IQ Admin Admin on 10- 12:19 PM--~ 
                            CL diet. [X] Mobility advancing as tolerated ~--Admin, IQ Admin Admin on 10- 12:19 PM--~ Pt up in room. [X] Pain assessment, One: 
                            [X] Pain controlled ~--Admin, IQ Admin Admin on 10- 12:20 PM--~ 
                            Dilaudid 1 mg iv Q 2 prn> rec'd x 2. Morphine 2 mg iv Q 4 prn> rec'd x 4. 
                             
                             
                             
  
Version: InterQual® 2019 [facility]  © 2019 [employer] and/or one of its [place]. All Rights Reserved. CPT only © 2018 American Medical Association. All Rights Reserved.  
   
Op Note from 10/11/19 by Laura Trejo RN  
 
   
Review Entered Review Status 10/14/2019 12:15 In Primary  
   
Criteria Review BRIEF OPERATIVE NOTE 
  
Date of Procedure: 10/11/2019 Preoperative Diagnosis: Colonic volvulus ([address] [K56.2] Postoperative Diagnosis: sigmoid volvulus, intestinal malrotation   
Procedure(s): OPEN COLON RESECTION - RESECTION OF COLONIC VOLVULUS, LADDS PROCEDURE, APPENDECTOMY Surgeon(s) and Role: 
   Chris Nieves DO - Primary 
  
Surgical Staff: 
Circ-1: Jennifer Leyva RN 
Circ-Relief: Conrad Norman RN; Freddy Sagastume RN Scrub Tech-1: Phyllis Penny Scrub Tech-Relief: Leelee Thomas Scrub RN-1: Blossom Hernández RN Surg Asst-1: Ilan Smoke Surg Asst-Relief: [facility] [facility] Time In Time Out Incision Start 10/11/2019 1616    
Incision Close 10/11/2019 1959    
  
Anesthesia: General  
Estimated Blood Loss: 100cc Specimens:  
ID Type Source Tests Collected by Time Destination 1 : APPENDIX Preservative Appendix   [place] [facility], DO 10/11/2019 1853 Pathology 2 : PROXIMAL AND DISTAL DONUTS Preservative Other                   [place] [facility], DO 10/11/2019 1859 Pathology 3 : SIGMOID AND UPPER RECTUM Preservative Sigmoid   [place] [facility], DO 10/11/2019 1938 Pathology 4 : LEFT COLON Preservative Colon, Left   [place] [facility], DO 10/11/2019 1939 Pathology  
  
Findings: malrotation, Jackson's bands, LOT on right, non-fixation of cecum and ascending colon, non-fixation of left and sigmoid, luiza's bands of mid descending colon to midline - very thick and wide. Volvulus of left colon between splenic flexure and bands and second severe volvulus of sigmoid and rectum with iris dilation 40YC.  
Complications: none Implants: * No implants in log *

## (undated) DEVICE — TRNQT TEXT 1X18IN BLU LF DISP -- CONVERT TO ITEM 362165

## (undated) DEVICE — TOWEL,OR,DSP,ST,BLUE,STD,4/PK,20PK/CS: Brand: MEDLINE

## (undated) DEVICE — SINGLE PORT MANIFOLD: Brand: NEPTUNE 2

## (undated) DEVICE — NDL PRT INJ NSAF BLNT 18GX1.5 --

## (undated) DEVICE — ROCKER SWITCH PENCIL HOLSTER: Brand: VALLEYLAB

## (undated) DEVICE — SOLUTION IV 500ML 0.9% SOD CHL FLX CONT

## (undated) DEVICE — WOUND RETRACTOR AND PROTECTOR: Brand: ALEXIS O WOUND PROTECTOR-RETRACTOR

## (undated) DEVICE — SEALER TISS L20CM DIA13MM ADV BPLR L CRV JAW OPN APPRCH

## (undated) DEVICE — Device

## (undated) DEVICE — DRAPE TWL SURG 16X26IN BLU ORB04] ALLCARE INC]

## (undated) DEVICE — SUT PROL 2-0 30IN SH BLU --

## (undated) DEVICE — STAPLER INT L75MM CUT LN L73MM STPL LN L77MM BLU B FRM 8

## (undated) DEVICE — STRAP,POSITIONING,KNEE/BODY,FOAM,4X60": Brand: MEDLINE

## (undated) DEVICE — SPONGE LAP 18X18IN STRL -- 5/PK

## (undated) DEVICE — GOWN,SIRUS,NONRNF,SETINSLV,XL,20/CS: Brand: MEDLINE

## (undated) DEVICE — SYRINGE 50ML E/T

## (undated) DEVICE — INTENDED FOR TISSUE SEPARATION, AND OTHER PROCEDURES THAT REQUIRE A SHARP SURGICAL BLADE TO PUNCTURE OR CUT.: Brand: BARD-PARKER ® CARBON RIB-BACK BLADES

## (undated) DEVICE — SPONGE GZ W4XL4IN RAYON POLY 4 PLY NONWOVEN FASTER WICKING

## (undated) DEVICE — ENDO CARRY-ON PROCEDURE KIT INCLUDES ENZYMATIC SPONGE, GAUZE, BIOHAZARD LABEL, TRAY, LUBRICANT, DIRTY SCOPE LABEL, WATER LABEL, TRAY, DRAWSTRING PAD, AND DEFENDO 4-PIECE KIT.: Brand: ENDO CARRY-ON PROCEDURE KIT

## (undated) DEVICE — KENDALL RADIOLUCENT FOAM MONITORING ELECTRODE RECTANGULAR SHAPE: Brand: KENDALL

## (undated) DEVICE — SYR 5ML 1/5 GRAD LL NSAF LF --

## (undated) DEVICE — MAJ-1414 SINGLE USE ADPATER BIOPSY VALV: Brand: SINGLE USE ADAPTOR BIOPSY VALVE

## (undated) DEVICE — Z DISCONTINUED USE 2660780 STAPLER INT L28CM DIA33MM CLS STPL H10-2.5MM OPN LEG L5.5MM

## (undated) DEVICE — SPONGE GZ W4XL4IN COT 12 PLY TYP VII WVN C FLD DSGN

## (undated) DEVICE — SUTURE PDS II SZ 1 L36IN ABSRB VLT L48MM CTX 1/2 CIR Z371T

## (undated) DEVICE — NDL FLTR TIP 5 MIC 18GX1.5IN --

## (undated) DEVICE — SET ADMIN 16ML TBNG L100IN 2 Y INJ SITE IV PIGGY BK DISP

## (undated) DEVICE — MAJOR: Brand: MEDLINE INDUSTRIES, INC.

## (undated) DEVICE — STERILE POLYISOPRENE POWDER-FREE SURGICAL GLOVES: Brand: PROTEXIS

## (undated) DEVICE — NEEDLE COUNTER: Brand: DEROYAL

## (undated) DEVICE — (D)PREP SKN CHLRAPRP APPL 26ML -- CONVERT TO ITEM 371833

## (undated) DEVICE — GARMENT,MEDLINE,DVT,INT,CALF,MED, GEN2: Brand: MEDLINE

## (undated) DEVICE — CANNULA CUSH AD W/ 14FT TBG

## (undated) DEVICE — SUTURE PDS + SZ 1 L96IN ABSRB VLT L65MM TP-1 1/2 CIR PDP880G

## (undated) DEVICE — MAYO STAND COVER: Brand: CONVERTORS

## (undated) DEVICE — CATH SUC CTRL PRT TRIFLO 14FR --

## (undated) DEVICE — NEEDLE HYPO 21GA L1.5IN GRN POLYPR HUB S STL THN WALL IV

## (undated) DEVICE — Z INACTIVE USE 2527070 DRAPE SURG W40XL44IN UNDERBUTTOCK SMS POLYPR W/ PCH BK DISP

## (undated) DEVICE — WRISTBAND ID AD W2.5XL9.5CM RED VYN ADH CLSR UNI-PRINT

## (undated) DEVICE — SUT VCRL + 2-0 27IN CT2 UD -- 36/BX

## (undated) DEVICE — SUT VCRL + 2-0 27IN SH UD --

## (undated) DEVICE — 4-PORT MANIFOLD: Brand: NEPTUNE 2

## (undated) DEVICE — RESERVOIR,SUCTION,100CC,SILICONE: Brand: MEDLINE

## (undated) DEVICE — SOL IRRIGATION INJ NACL 0.9% 500ML BTL

## (undated) DEVICE — TRAY CATH OD16FR SIL URIN M STATLOK STBL DEV SURSTP

## (undated) DEVICE — CATH IV SAFE STR 22GX1IN BLU -- PROTECTIV PLUS

## (undated) DEVICE — MEDI-VAC NON-CONDUCTIVE SUCTION TUBING: Brand: CARDINAL HEALTH

## (undated) DEVICE — BLADE ELECTRODE: Brand: EDGE

## (undated) DEVICE — SYR 3ML LL TIP 1/10ML GRAD --

## (undated) DEVICE — INSULATED BLADE ELECTRODE: Brand: EDGE

## (undated) DEVICE — REM POLYHESIVE ADULT PATIENT RETURN ELECTRODE: Brand: VALLEYLAB

## (undated) DEVICE — SYR 20ML LL STRL LF --

## (undated) DEVICE — LEGGINGS, PAIR, 31X48, STERILE: Brand: MEDLINE

## (undated) DEVICE — RELOAD STPL L75MM OPN H3.8MM CLS 1.5MM WIRE DIA0.2MM REG

## (undated) DEVICE — SUTURE VCRL + SZ 3-0 L18IN ABSRB UD SH 1/2 CIR TAPERCUT NDL VCP864D

## (undated) DEVICE — SUT ETHLN 2-0 18IN FS BLK --

## (undated) DEVICE — STERILE POLYISOPRENE POWDER-FREE SURGICAL GLOVES WITH EMOLLIENT COATING: Brand: PROTEXIS

## (undated) DEVICE — TRAP SPEC COLL POLYP POLYSTYR --